# Patient Record
Sex: FEMALE | Race: WHITE | NOT HISPANIC OR LATINO | Employment: FULL TIME | URBAN - METROPOLITAN AREA
[De-identification: names, ages, dates, MRNs, and addresses within clinical notes are randomized per-mention and may not be internally consistent; named-entity substitution may affect disease eponyms.]

---

## 2017-03-24 ENCOUNTER — HOSPITAL ENCOUNTER (OUTPATIENT)
Dept: URGENT CARE | Facility: CLINIC | Age: 32
Setting detail: SPECIMEN
Discharge: HOME | End: 2017-03-24
Attending: FAMILY MEDICINE | Admitting: FAMILY MEDICINE

## 2017-03-24 LAB
C TRACH RRNA SPEC QL PROBE: NORMAL
N GONORRHOEA RRNA SPEC QL PROBE: NORMAL
SPECIMEN SOURCE: NORMAL

## 2017-03-25 LAB
HSV1 DNA SPEC QL NAA+PROBE: POSITIVE
SPECIMEN SOURCE: NORMAL

## 2017-04-06 ENCOUNTER — CONVERSION ENCOUNTER (OUTPATIENT)
Dept: FAMILY MEDICINE CLINIC | Facility: CLINIC | Age: 32
End: 2017-04-06

## 2017-04-06 LAB
ALBUMIN SERPL-MCNC: 4.4 G/DL (ref 3.6–5.1)
ALBUMIN/GLOB SERPL: ABNORMAL {RATIO} (ref 1–2.5)
ALP SERPL-CCNC: 41 UNITS/L (ref 33–115)
ALT SERPL-CCNC: 10 UNITS/L (ref 6–29)
AST SERPL-CCNC: 11 UNITS/L (ref 10–30)
BASOPHILS # BLD AUTO: ABNORMAL 10*3/MM3 (ref 0–200)
BASOPHILS NFR BLD AUTO: 0.3 %
BILIRUB SERPL-MCNC: 0.4 MG/DL (ref 0.2–1.2)
BUN SERPL-MCNC: 12 MG/DL (ref 7–25)
BUN/CREAT SERPL: ABNORMAL (ref 6–22)
CALCIUM SERPL-MCNC: 9.5 MG/DL (ref 8.6–10.2)
CHLORIDE SERPL-SCNC: 106 MMOL/L (ref 98–110)
CHOLEST SERPL-MCNC: 170 MG/DL (ref 125–200)
CHOLEST/HDLC SERPL: ABNORMAL {RATIO}
CO2 CONTENT VENOUS: 27 MMOL/L (ref 20–31)
CONV % HGB A1C: ABNORMAL %
CONV NEUTROPHILS/100 LEUKOCYTES IN BODY FLUID BY MANUAL COUNT: 55.7 %
CONV TOTAL PROTEIN: 6.8 G/DL (ref 6.1–8.1)
CREAT UR-MCNC: 0.7 MG/DL (ref 0.5–1.1)
EOSINOPHIL # BLD AUTO: 2.5 %
EOSINOPHIL # BLD AUTO: ABNORMAL 10*3/MM3 (ref 15–500)
ERYTHROCYTE [DISTWIDTH] IN BLOOD BY AUTOMATED COUNT: 13.6 % (ref 11–15)
GLOBULIN UR ELPH-MCNC: ABNORMAL G/DL (ref 1.9–3.7)
GLUCOSE SERPL-MCNC: 105 MG/DL (ref 65–99)
HCT VFR BLD AUTO: 42.2 % (ref 35–45)
HDLC SERPL-MCNC: 35 MG/DL
HGB BLD-MCNC: 13.9 G/DL (ref 11.7–15.5)
HSV1 IGG SER IA-ACNC: ABNORMAL
HSV2 IGG SER IA-ACNC: ABNORMAL
LDLC SERPL CALC-MCNC: ABNORMAL MG/DL
LYMPHOCYTES # BLD AUTO: ABNORMAL 10*3/MM3 (ref 850–3900)
LYMPHOCYTES NFR BLD AUTO: 37 %
MCH RBC QN AUTO: 29 PG (ref 27–33)
MCHC RBC AUTO-ENTMCNC: ABNORMAL % (ref 32–36)
MCV RBC AUTO: 88.1 FL (ref 80–100)
MONOCYTES # BLD AUTO: ABNORMAL 10*3/MICROLITER (ref 200–950)
MONOCYTES NFR BLD AUTO: 4.5 %
NEUTROPHILS # BLD AUTO: ABNORMAL 10*3/MM3 (ref 1500–7800)
PLATELET # BLD AUTO: ABNORMAL 10*3/MM3 (ref 140–400)
PMV BLD AUTO: 9.1 FL (ref 7.5–12.5)
POTASSIUM SERPL-SCNC: 4.5 MMOL/L (ref 3.5–5.3)
RBC # BLD AUTO: ABNORMAL 10*6/MM3 (ref 3.8–5.1)
SODIUM SERPL-SCNC: 139 MMOL/L (ref 135–146)
TRIGL SERPL-MCNC: 170 MG/DL
TSH SERPL-ACNC: 3.34 MICROINTL UNITS/ML
WBC # BLD AUTO: ABNORMAL K/UL (ref 3.8–10.8)

## 2017-05-09 ENCOUNTER — HOSPITAL ENCOUNTER (OUTPATIENT)
Dept: CARDIOLOGY | Facility: HOSPITAL | Age: 32
Discharge: HOME | End: 2017-05-09
Attending: FAMILY MEDICINE | Admitting: FAMILY MEDICINE

## 2017-10-23 ENCOUNTER — HOSPITAL ENCOUNTER (OUTPATIENT)
Dept: FAMILY MEDICINE CLINIC | Facility: CLINIC | Age: 32
Setting detail: SPECIMEN
Discharge: HOME | End: 2017-10-23
Attending: NURSE PRACTITIONER | Admitting: NURSE PRACTITIONER

## 2017-10-23 LAB
ALBUMIN SERPL-MCNC: 4 G/DL (ref 3.5–4.8)
ALBUMIN/GLOB SERPL: 1.7 {RATIO} (ref 1–1.7)
ALP SERPL-CCNC: 30 IU/L (ref 32–91)
ALT SERPL-CCNC: 12 IU/L (ref 14–54)
ANION GAP SERPL CALC-SCNC: 11.1 MMOL/L (ref 10–20)
AST SERPL-CCNC: 17 IU/L (ref 15–41)
BILIRUB SERPL-MCNC: 0.7 MG/DL (ref 0.3–1.2)
BUN SERPL-MCNC: 9 MG/DL (ref 8–20)
BUN/CREAT SERPL: 12.9 (ref 5.4–26.2)
CALCIUM SERPL-MCNC: 9.3 MG/DL (ref 8.9–10.3)
CHLORIDE SERPL-SCNC: 105 MMOL/L (ref 101–111)
CONV CO2: 25 MMOL/L (ref 22–32)
CONV TOTAL PROTEIN: 6.3 G/DL (ref 6.1–7.9)
CREAT UR-MCNC: 0.7 MG/DL (ref 0.4–1)
GLOBULIN UR ELPH-MCNC: 2.3 G/DL (ref 2.5–3.8)
GLUCOSE SERPL-MCNC: 97 MG/DL (ref 65–99)
POTASSIUM SERPL-SCNC: 4.1 MMOL/L (ref 3.6–5.1)
SODIUM SERPL-SCNC: 137 MMOL/L (ref 136–144)

## 2018-01-29 ENCOUNTER — HOSPITAL ENCOUNTER (OUTPATIENT)
Dept: FAMILY MEDICINE CLINIC | Facility: CLINIC | Age: 33
Setting detail: SPECIMEN
Discharge: HOME | End: 2018-01-29
Attending: NURSE PRACTITIONER | Admitting: NURSE PRACTITIONER

## 2018-01-29 LAB
ALBUMIN SERPL-MCNC: 3.9 G/DL (ref 3.5–4.8)
ALBUMIN/GLOB SERPL: 1.7 {RATIO} (ref 1–1.7)
ALP SERPL-CCNC: 36 IU/L (ref 32–91)
ALT SERPL-CCNC: 14 IU/L (ref 14–54)
ANION GAP SERPL CALC-SCNC: 10.6 MMOL/L (ref 10–20)
AST SERPL-CCNC: 14 IU/L (ref 15–41)
BILIRUB SERPL-MCNC: 1 MG/DL (ref 0.3–1.2)
BUN SERPL-MCNC: 12 MG/DL (ref 8–20)
BUN/CREAT SERPL: 15 (ref 5.4–26.2)
CALCIUM SERPL-MCNC: 8.9 MG/DL (ref 8.9–10.3)
CHLORIDE SERPL-SCNC: 105 MMOL/L (ref 101–111)
CONV CO2: 25 MMOL/L (ref 22–32)
CONV TOTAL PROTEIN: 6.2 G/DL (ref 6.1–7.9)
CREAT UR-MCNC: 0.8 MG/DL (ref 0.4–1)
GLOBULIN UR ELPH-MCNC: 2.3 G/DL (ref 2.5–3.8)
GLUCOSE SERPL-MCNC: 112 MG/DL (ref 65–99)
POTASSIUM SERPL-SCNC: 3.6 MMOL/L (ref 3.6–5.1)
SODIUM SERPL-SCNC: 137 MMOL/L (ref 136–144)

## 2019-03-07 ENCOUNTER — HOSPITAL ENCOUNTER (OUTPATIENT)
Dept: FAMILY MEDICINE CLINIC | Facility: CLINIC | Age: 34
Setting detail: SPECIMEN
Discharge: HOME | End: 2019-03-07
Attending: NURSE PRACTITIONER | Admitting: NURSE PRACTITIONER

## 2019-03-07 LAB
ALBUMIN SERPL-MCNC: 3.9 G/DL (ref 3.5–4.8)
ALBUMIN/GLOB SERPL: 1.4 {RATIO} (ref 1–1.7)
ALP SERPL-CCNC: 44 IU/L (ref 32–91)
ALT SERPL-CCNC: 17 IU/L (ref 14–54)
ANION GAP SERPL CALC-SCNC: 11.8 MMOL/L (ref 10–20)
AST SERPL-CCNC: 19 IU/L (ref 15–41)
BACTERIA SPEC AEROBE CULT: NORMAL
BASOPHILS # BLD AUTO: 0 10*3/UL (ref 0–0.2)
BASOPHILS NFR BLD AUTO: 1 % (ref 0–2)
BILIRUB SERPL-MCNC: 0.7 MG/DL (ref 0.3–1.2)
BILIRUB UR QL STRIP: NEGATIVE MG/DL
BUN SERPL-MCNC: 10 MG/DL (ref 8–20)
BUN/CREAT SERPL: 12.5 (ref 5.4–26.2)
CALCIUM SERPL-MCNC: 9 MG/DL (ref 8.9–10.3)
CASTS URNS QL MICRO: ABNORMAL /[LPF]
CHLORIDE SERPL-SCNC: 104 MMOL/L (ref 101–111)
CHOLEST SERPL-MCNC: 184 MG/DL
CHOLEST/HDLC SERPL: 4.6 {RATIO}
COLOR UR: YELLOW
CONV BACTERIA IN URINE MICRO: ABNORMAL
CONV CLARITY OF URINE: ABNORMAL
CONV CO2: 24 MMOL/L (ref 22–32)
CONV HYALINE CASTS IN URINE MICRO: 0 /[LPF] (ref 0–5)
CONV LDL CHOLESTEROL DIRECT: 135 MG/DL (ref 0–100)
CONV PROTEIN IN URINE BY AUTOMATED TEST STRIP: NEGATIVE MG/DL
CONV SMALL ROUND CELLS: ABNORMAL /[HPF]
CONV TOTAL PROTEIN: 6.7 G/DL (ref 6.1–7.9)
CONV UROBILINOGEN IN URINE BY AUTOMATED TEST STRIP: 0.2 MG/DL
CREAT UR-MCNC: 0.8 MG/DL (ref 0.4–1)
CULTURE INDICATED?: ABNORMAL
DIFFERENTIAL METHOD BLD: (no result)
EOSINOPHIL # BLD AUTO: 0.1 10*3/UL (ref 0–0.3)
EOSINOPHIL # BLD AUTO: 1 % (ref 0–3)
ERYTHROCYTE [DISTWIDTH] IN BLOOD BY AUTOMATED COUNT: 13.8 % (ref 11.5–14.5)
GLOBULIN UR ELPH-MCNC: 2.8 G/DL (ref 2.5–3.8)
GLUCOSE SERPL-MCNC: 126 MG/DL (ref 65–99)
GLUCOSE UR QL: NEGATIVE MG/DL
HCT VFR BLD AUTO: 43.4 % (ref 35–49)
HDLC SERPL-MCNC: 40 MG/DL
HGB BLD-MCNC: 14.3 G/DL (ref 12–15)
HGB UR QL STRIP: ABNORMAL
KETONES UR QL STRIP: NEGATIVE MG/DL
LDLC/HDLC SERPL: 3.3 {RATIO}
LEUKOCYTE ESTERASE UR QL STRIP: ABNORMAL
LIPID INTERPRETATION: ABNORMAL
LYMPHOCYTES # BLD AUTO: 2.9 10*3/UL (ref 0.8–4.8)
LYMPHOCYTES NFR BLD AUTO: 42 % (ref 18–42)
Lab: NORMAL
MCH RBC QN AUTO: 29.2 PG (ref 26–32)
MCHC RBC AUTO-ENTMCNC: 33.1 G/DL (ref 32–36)
MCV RBC AUTO: 88.3 FL (ref 80–94)
MICRO REPORT STATUS: NORMAL
MONOCYTES # BLD AUTO: 0.5 10*3/UL (ref 0.1–1.3)
MONOCYTES NFR BLD AUTO: 7 % (ref 2–11)
NEUTROPHILS # BLD AUTO: 3.3 10*3/UL (ref 2.3–8.6)
NEUTROPHILS NFR BLD AUTO: 49 % (ref 50–75)
NITRITE UR QL STRIP: NEGATIVE
NRBC BLD AUTO-RTO: 0 /100{WBCS}
NRBC/RBC NFR BLD MANUAL: 0 10*3/UL
PH UR STRIP.AUTO: 5.5 [PH] (ref 4.5–8)
PLATELET # BLD AUTO: 263 10*3/UL (ref 150–450)
PMV BLD AUTO: 8.3 FL (ref 7.4–10.4)
POTASSIUM SERPL-SCNC: 3.8 MMOL/L (ref 3.6–5.1)
RBC # BLD AUTO: 4.91 10*6/UL (ref 4–5.4)
RBC #/AREA URNS HPF: 4 /[HPF] (ref 0–3)
SODIUM SERPL-SCNC: 136 MMOL/L (ref 136–144)
SP GR UR: 1.03 (ref 1–1.03)
SPECIMEN SOURCE: NORMAL
SPERM URNS QL MICRO: ABNORMAL /[HPF]
SQUAMOUS SPT QL MICRO: 6 /[HPF] (ref 0–5)
TRIGL SERPL-MCNC: 111 MG/DL
UNIDENT CRYS URNS QL MICRO: ABNORMAL /[HPF]
VLDLC SERPL CALC-MCNC: 8.3 MG/DL
WBC # BLD AUTO: 6.9 10*3/UL (ref 4.5–11.5)
WBC #/AREA URNS HPF: 23 /[HPF] (ref 0–5)
YEAST SPEC QL WET PREP: ABNORMAL /[HPF]

## 2019-03-11 ENCOUNTER — HOSPITAL ENCOUNTER (OUTPATIENT)
Dept: ULTRASOUND IMAGING | Facility: HOSPITAL | Age: 34
Discharge: HOME | End: 2019-03-11
Attending: NURSE PRACTITIONER | Admitting: NURSE PRACTITIONER

## 2019-06-03 ENCOUNTER — CONVERSION ENCOUNTER (OUTPATIENT)
Dept: FAMILY MEDICINE CLINIC | Facility: CLINIC | Age: 34
End: 2019-06-03

## 2019-06-04 VITALS
SYSTOLIC BLOOD PRESSURE: 120 MMHG | BODY MASS INDEX: 47.05 KG/M2 | RESPIRATION RATE: 8 BRPM | HEART RATE: 64 BPM | WEIGHT: 293 LBS | DIASTOLIC BLOOD PRESSURE: 82 MMHG

## 2019-06-06 NOTE — PROGRESS NOTES
"Vital Signs:    Patient Profile:    34 Years Old Female  Height:     68.5 inches  Weight:     314 pounds  BMI:        47.04     Temp:       98.1 degrees F oral  Pulse rate: 64 / minute  Pulse rhythm:   regular  Resp:       8 per minute  BP Sittin / 82  (left arm)    Cuff size:  large      Problems: Active problems were reviewed with the patient during this visit.  Medications: Medications were reviewed with the patient during this visit.  Allergies: Allergies were reviewed with the patient during this visit.  No Known Allergy.        Vitals Entered By: [facility]      Visit Type:  Acute Visit  Referring Provider:  SALO Matthews  Primary Provider:  Teresita LEONG    CC:  back pain.    History of Present Illness:  34-year-old white female presents to office today with c/o back pain x1week. Denies injury.    Current: 8/10.  Worse: 10/10. Best: 0/10.   Pain starts in her lumbar area and radiates to neck. Also reports middle left finger twitching.   Describes pain as a buring sensation. Sometimes it is sharp.    Tried stretching and Alieve- \"took the edge offf of pain but did not take it away.\"   Moving makes her pain worse.  Pain improves when lying down.   Pain has not prevented patient from working, attending social functions, performing ADLs, or from performing house chores.     [employer]- closed 2 weeks ago and she is not working.  She has been hired at [employer].       Past Medical History:     Reviewed history from 2017 and no changes required:        Headache, Migraine        Palpitation        Hospitalizations: childbirth        Health Maintenance: denies abnormal pap        genital herpes        prediabetes         Social History:     Reviewed history from 2019 and no changes required:        Marital status: relationship         Children: 3 (ages 9,8,6)        Occupation:         Education: [employer] for radiology         Sexually active: yes, same partner since ~10/2016        Risk Factors: "     Smoked Tobacco Use:  Former smoker     Cigarettes:  Yes        Years smoked:  3        Year quit:  2005        Years Since Last Quit:  14  Smokeless Tobacco Use:  Never  Passive smoke exposure:  yes  Drug use:  no  Caffeine use:  3 drinks per day  Alcohol use:  yes     Drinks per day:  <1  Exercise:  no  Seatbelt use:  100 %  Sun Exposure:  rarely    Family History Risk Factors:     Family History of MI in females < 65 years old:  no     Family History of MI in males < 55 years old:  no      Review of Systems     Eyes       Denies double vision, eye irritation, blurring, eye pain and discharge.    ENT       Denies ringing in the ears.    GI       Denies nausea, vomiting, diarrhea and constipation.    MS       Complains of back pain.    Neuro       Denies numbness and tingling.      Physical Exam   Weight:  303.6  BP:  120/82 mm HG    Medication List:  OZEMPIC 0.25 OR 0.5 MG/DOSE SUBCUTANEOUS SOLUTION PEN-INJECTOR (SEMAGLUTIDE) 0.5mg once weekly with needles  CYCLOBENZAPRINE HCL 10 MG ORAL TABLET (CYCLOBENZAPRINE HCL) 1 po tid prn  MEDROL 4 MG ORAL TABLET THERAPY PACK (METHYLPREDNISOLONE) take 1 pack as directed  SYNTHROID 50 MCG ORAL TABLET (LEVOTHYROXINE SODIUM) 1 po daily  GIOVANNA CONTOUR MONITOR w/Device KIT (BLOOD GLUCOSE MONITORING SUPPL) machine strips and lancets  test once daily  DX:E11.9  VALTREX 500 MG ORAL TABLET (VALACYCLOVIR HCL) one tablet by mouth daily      Surgical History   Tubal ligation 3/2009    ,    Risk Factors  Tobacco Use: Former smoker  Passive smoke exposure: yes  Exercise: no  Illicit Drug use: no      Physical Examination   General Appearance   In no acute distress.  Alert & oriented.  Behavior and affect appropriate to situation  Skin   No suspicious lesions, moles or rashes . Turgor good  Neck   Supple.  No adenopathy.   Full active ROM with minimal discomfort  Cardiovascular   Regular rate and rhythm  Lungs   Clear to auscultation  Back   decreased overall flexibility, lateral  extension decreased, tender along spine, straight leg lift negative   Musculoskeletal   Gait and station normal, with adequate muscle strength and tone.  Normal ROM to neck, back and extremities.           Impression & Recommendations:    Problem # 1:  LUMBAGO (ICD-724.2) (PYF14-O09.5)    Her updated medication list for this problem includes:     Cyclobenzaprine Hcl 10 Mg Oral Tablet (Cyclobenzaprine hcl) ..... 1 po tid prn    Orders:  Depo-Medrol 80 mg ()  Injection, therapeutic (CPT-49999)      Problem # 2:  PREDIABETES (ICD-790.29) (MFJ12-K00.09)  pt never followed up on ozempic. She had started it, but then missed her followup appt. Was tolerating it ok. We had stopped the metformin due to neg. side effects, and we started ozempic for both prediabetes and to see if it would help with weight loss.   Will give rx.     The following medications were removed from the medication list:     Metformin Er 500mg 24hr Tabs (Metformin hcl) ..... Take 1 tablet by mouth daily    Her updated medication list for this problem includes:     Ozempic 0.25 or 0.5 Mg/dose Subcutaneous Solution Pen-injector (Semaglutide) ..... 0.5mg once weekly with needles      Medications Added to Medication List This Visit:  1)  Ozempic 0.25 or 0.5 Mg/dose Subcutaneous Solution Pen-injector (Semaglutide) .... 0.5mg once weekly with needles  2)  Cyclobenzaprine Hcl 10 Mg Oral Tablet (Cyclobenzaprine hcl) .... 1 po tid prn  3)  Medrol 4 Mg Oral Tablet Therapy Pack (Methylprednisolone) .... Take 1 pack as directed      Patient Instructions:  1)  80mg depo medrol inj  2)  medrol dose pack  3)  flexeril prn  4)  ice/moist heat  5)  stretches. handout given.   6)  rx for ozempic  7)  follow up as needed   8)  During this office visit we discussed the pertinent aspects of the visit and the treatment recommendations.  Patient was given the opportunity to ask questions and discuss other concerns.                Medication Administration    Orders  Added:  1)  Depo-Medrol 80 mg []  2)  Injection, therapeutic [CPT-31149]  3)  Ofc Vst, Est Level IV [46068]        Electronically signed by Teresita LEONG on 06/03/2019 at 4:38 PM  ________________________________________________________________________                  Medication Administration    Injection # 1:     Medication: depo medrol 80mg     Route: IM     Site: [facility]     Exp Date: 06/20     Lot #: [id number]     Mfr: teva     Given by: Michell ECHEVARRIA     Date Given:06/03/2019 4:41 PM     Tolerated w/o complications                Electronically signed by Michell ECHEVARRIA on 06/03/2019 at 4:41 PM  ________________________________________________________________________       Disclaimer: Converted Note message may not contain all data elements that existed in the [employer] source system. Please see [facility] [employer] for the original note details.

## 2019-08-17 ENCOUNTER — APPOINTMENT (OUTPATIENT)
Dept: CT IMAGING | Facility: HOSPITAL | Age: 34
End: 2019-08-17

## 2019-08-17 ENCOUNTER — HOSPITAL ENCOUNTER (EMERGENCY)
Facility: HOSPITAL | Age: 34
Discharge: HOME | End: 2019-08-17
Admitting: EMERGENCY MEDICINE

## 2019-08-17 VITALS
WEIGHT: 293 LBS | TEMPERATURE: 98.3 F | HEIGHT: 68 IN | DIASTOLIC BLOOD PRESSURE: 77 MMHG | OXYGEN SATURATION: 99 % | SYSTOLIC BLOOD PRESSURE: 131 MMHG | RESPIRATION RATE: 18 BRPM | BODY MASS INDEX: 44.41 KG/M2 | HEART RATE: 58 BPM

## 2019-08-17 DIAGNOSIS — R42 DIZZINESS: Primary | ICD-10-CM

## 2019-08-17 DIAGNOSIS — R42 VERTIGO: ICD-10-CM

## 2019-08-17 LAB
ALBUMIN SERPL-MCNC: 4.1 G/DL (ref 3.5–4.8)
ALBUMIN/GLOB SERPL: 1.5 G/DL (ref 1–1.7)
ALP SERPL-CCNC: 43 U/L (ref 32–91)
ALT SERPL W P-5'-P-CCNC: 16 U/L (ref 14–54)
AMPHET+METHAMPHET UR QL: NEGATIVE
ANION GAP SERPL CALCULATED.3IONS-SCNC: 14.6 MMOL/L (ref 5–15)
AST SERPL-CCNC: 17 U/L (ref 15–41)
B-HCG UR QL: NEGATIVE
BACTERIA UR QL AUTO: ABNORMAL /HPF
BARBITURATES UR QL SCN: NEGATIVE
BASOPHILS # BLD AUTO: 0.1 10*3/MM3 (ref 0–0.2)
BASOPHILS NFR BLD AUTO: 1.5 % (ref 0–1.5)
BENZODIAZ UR QL SCN: NEGATIVE
BILIRUB SERPL-MCNC: 0.8 MG/DL (ref 0.3–1.2)
BILIRUB UR QL STRIP: NEGATIVE
BUN BLD-MCNC: 14 MG/DL (ref 8–20)
BUN/CREAT SERPL: 17.5 (ref 5.4–26.2)
CALCIUM SPEC-SCNC: 8.9 MG/DL (ref 8.9–10.3)
CANNABINOIDS SERPL QL: NEGATIVE
CHLORIDE SERPL-SCNC: 101 MMOL/L (ref 101–111)
CLARITY UR: CLEAR
CO2 SERPL-SCNC: 23 MMOL/L (ref 22–32)
COCAINE UR QL: NEGATIVE
COLOR UR: YELLOW
CREAT BLD-MCNC: 0.8 MG/DL (ref 0.4–1)
CREAT UR-MCNC: 16.4 MG/DL
DEPRECATED RDW RBC AUTO: 42.9 FL (ref 37–54)
EOSINOPHIL # BLD AUTO: 0.1 10*3/MM3 (ref 0–0.4)
EOSINOPHIL NFR BLD AUTO: 1.1 % (ref 0.3–6.2)
ERYTHROCYTE [DISTWIDTH] IN BLOOD BY AUTOMATED COUNT: 13.9 % (ref 12.3–15.4)
GFR SERPL CREATININE-BSD FRML MDRD: 82 ML/MIN/1.73
GLOBULIN UR ELPH-MCNC: 2.7 GM/DL (ref 2.5–3.8)
GLUCOSE BLD-MCNC: 98 MG/DL (ref 65–99)
GLUCOSE BLDC GLUCOMTR-MCNC: 92 MG/DL (ref 70–105)
GLUCOSE UR STRIP-MCNC: NEGATIVE MG/DL
HCT VFR BLD AUTO: 40.5 % (ref 34–46.6)
HGB BLD-MCNC: 13.6 G/DL (ref 12–15.9)
HGB UR QL STRIP.AUTO: NEGATIVE
HOLD SPECIMEN: NORMAL
HOLD SPECIMEN: NORMAL
HYALINE CASTS UR QL AUTO: ABNORMAL /LPF
KETONES UR QL STRIP: NEGATIVE
LEUKOCYTE ESTERASE UR QL STRIP.AUTO: ABNORMAL
LYMPHOCYTES # BLD AUTO: 3.1 10*3/MM3 (ref 0.7–3.1)
LYMPHOCYTES NFR BLD AUTO: 37.2 % (ref 19.6–45.3)
MCH RBC QN AUTO: 29.5 PG (ref 26.6–33)
MCHC RBC AUTO-ENTMCNC: 33.5 G/DL (ref 31.5–35.7)
MCV RBC AUTO: 88 FL (ref 79–97)
METHADONE UR QL SCN: NEGATIVE
MONOCYTES # BLD AUTO: 0.6 10*3/MM3 (ref 0.1–0.9)
MONOCYTES NFR BLD AUTO: 7 % (ref 5–12)
NEUTROPHILS # BLD AUTO: 4.4 10*3/MM3 (ref 1.7–7)
NEUTROPHILS NFR BLD AUTO: 53.2 % (ref 42.7–76)
NITRITE UR QL STRIP: NEGATIVE
NRBC BLD AUTO-RTO: 0.1 /100 WBC (ref 0–0.2)
OPIATES UR QL: NEGATIVE
PCP UR QL SCN: NEGATIVE
PH UR STRIP.AUTO: 6 [PH] (ref 5–8)
PLATELET # BLD AUTO: 281 10*3/MM3 (ref 140–450)
PMV BLD AUTO: 8.4 FL (ref 6–12)
POTASSIUM BLD-SCNC: 3.6 MMOL/L (ref 3.6–5.1)
PROT SERPL-MCNC: 6.8 G/DL (ref 6.1–7.9)
PROT UR QL STRIP: NEGATIVE
RBC # BLD AUTO: 4.6 10*6/MM3 (ref 3.77–5.28)
RBC # UR: ABNORMAL /HPF
REF LAB TEST METHOD: ABNORMAL
SODIUM BLD-SCNC: 135 MMOL/L (ref 136–144)
SP GR UR STRIP: 1.01 (ref 1–1.03)
SQUAMOUS #/AREA URNS HPF: ABNORMAL /HPF
TROPONIN I SERPL-MCNC: <0.03 NG/ML (ref 0–0.03)
UROBILINOGEN UR QL STRIP: ABNORMAL
WBC NRBC COR # BLD: 8.3 10*3/MM3 (ref 3.4–10.8)
WBC UR QL AUTO: ABNORMAL /HPF
WHOLE BLOOD HOLD SPECIMEN: NORMAL
WHOLE BLOOD HOLD SPECIMEN: NORMAL

## 2019-08-17 PROCEDURE — 82570 ASSAY OF URINE CREATININE: CPT | Performed by: PHYSICIAN ASSISTANT

## 2019-08-17 PROCEDURE — 80307 DRUG TEST PRSMV CHEM ANLYZR: CPT | Performed by: PHYSICIAN ASSISTANT

## 2019-08-17 PROCEDURE — 99284 EMERGENCY DEPT VISIT MOD MDM: CPT

## 2019-08-17 PROCEDURE — 93005 ELECTROCARDIOGRAM TRACING: CPT | Performed by: PHYSICIAN ASSISTANT

## 2019-08-17 PROCEDURE — 81025 URINE PREGNANCY TEST: CPT | Performed by: PHYSICIAN ASSISTANT

## 2019-08-17 PROCEDURE — 81001 URINALYSIS AUTO W/SCOPE: CPT | Performed by: PHYSICIAN ASSISTANT

## 2019-08-17 PROCEDURE — 82962 GLUCOSE BLOOD TEST: CPT

## 2019-08-17 PROCEDURE — 80053 COMPREHEN METABOLIC PANEL: CPT | Performed by: PHYSICIAN ASSISTANT

## 2019-08-17 PROCEDURE — 84484 ASSAY OF TROPONIN QUANT: CPT | Performed by: PHYSICIAN ASSISTANT

## 2019-08-17 PROCEDURE — 85025 COMPLETE CBC W/AUTO DIFF WBC: CPT | Performed by: PHYSICIAN ASSISTANT

## 2019-08-17 PROCEDURE — 70450 CT HEAD/BRAIN W/O DYE: CPT

## 2019-08-17 PROCEDURE — 94770: CPT

## 2019-08-17 RX ORDER — SODIUM CHLORIDE 0.9 % (FLUSH) 0.9 %
10 SYRINGE (ML) INJECTION AS NEEDED
Status: DISCONTINUED | OUTPATIENT
Start: 2019-08-17 | End: 2019-08-17 | Stop reason: HOSPADM

## 2019-08-17 RX ADMIN — SODIUM CHLORIDE 1000 ML: 0.9 INJECTION, SOLUTION INTRAVENOUS at 13:43

## 2019-08-17 NOTE — ED PROVIDER NOTES
"Subjective   History:  She is a 34-year-old female presents to the ER complaining of dizziness.  She reports it feels like the world is spinning.  She reports it started this morning.  She reports that she has \"borderline diabetes\".  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  Earlier this week she did have on Monday some abdominal pain and diarrhea but that is since resolved.    Onset: 1 day  Location: generalized  Duration: intermittent  Character: dizziness  Aggravating/Alleviating factors: None  Radiation none  Severity: moderate            Review of Systems   Constitutional: Negative.    HENT: Negative.    Respiratory: Negative.    Cardiovascular: Negative.    Gastrointestinal: Negative.    Genitourinary: Negative.    Musculoskeletal: Negative.    Skin: Negative.    Neurological: Positive for dizziness and light-headedness.   Psychiatric/Behavioral: Negative.        No past medical history on file.    No Known Allergies    No past surgical history on file.    No family history on file.    Social History     Socioeconomic History   • Marital status: Single     Spouse name: Not on file   • Number of children: Not on file   • Years of education: Not on file   • Highest education level: Not on file           Objective   Physical Exam   Constitutional: She is oriented to person, place, and time. She appears well-developed and well-nourished.   HENT:   Head: Normocephalic and atraumatic.   Eyes: Pupils are equal, round, and reactive to light.   Neck: Normal range of motion.   Cardiovascular: Normal rate and regular rhythm.   Pulmonary/Chest: Effort normal and breath sounds normal.   Musculoskeletal: Normal range of motion.   Neurological: She is alert and oriented to person, place, and time.   Skin: Skin is warm and dry.   Psychiatric: She has a normal mood and affect. Her behavior is normal.       Procedures           ED Course    "   Ct Head Without Contrast    Result Date: 8/17/2019  No acute intracranial abnormality.  Electronically Signed By-Zachary Manning On:8/17/2019 3:11 PM This report was finalized on [id number] by  Zachary Manning, .    Labs Reviewed   URINALYSIS W/ MICROSCOPIC IF INDICATED (NO CULTURE) - Abnormal; Notable for the following components:       Result Value    Leuk Esterase, UA Trace (*)     All other components within normal limits   COMPREHENSIVE METABOLIC PANEL - Abnormal; Notable for the following components:    Sodium 135 (*)     All other components within normal limits   URINALYSIS, MICROSCOPIC ONLY - Abnormal; Notable for the following components:    RBC, UA 0-2 (*)     WBC, UA 3-5 (*)     All other components within normal limits   PREGNANCY, URINE - Normal   TROPONIN (IN-HOUSE) - Normal    Narrative:     Troponin I Reference Range:    0.00-0.03  Negative.  Repeat testing in 4-6 hours if clinically indicated.    0.04-0.29  Suspicious for myocardial injury. Serial measurements and clinical  correlation may be necessary to confirm or exclude diagnosis of acute  coronary syndrome.  Repeat testing in 4-6 hours if indicated.     >0.29 Consistent with myocardial injury.  Recommend clinical and laboratory correlation.     Results my be falsely decreased if patient taking Biotin.    CBC WITH AUTO DIFFERENTIAL - Normal   POCT GLUCOSE FINGERSTICK - Normal   RAINBOW DRAW    Narrative:     The following orders were created for panel order [place] Draw.  Procedure                               Abnormality         Status                     ---------                               -----------         ------                     Light Blue Top[[id number]]                                   Final result               Green Top (Gel)[[id number]]                                  Final result               Lavender Top[[id number]]                                     Final result               Gold Top - [facility][[id number]]                                    Final result                 Please view results for these tests on the individual orders.   URINE DRUG SCREEN    Narrative:     Specimens with a urine creatinine <20mg/dL may express a dilution effect which can cause false negatives for the drug results.    Specimens with a urine creatinine <20mg/dL may express a dilution effect which can cause false negatives for the drug results.    All urine drugs of abuse requests without chain of custody are for medical screening purposes only.  False positives are possible.     LIGHT BLUE TOP   GREEN TOP   LAVENDER TOP   GOLD TOP - SST   CBC AND DIFFERENTIAL    Narrative:     The following orders were created for panel order CBC & Differential.  Procedure                               Abnormality         Status                     ---------                               -----------         ------                     CBC Auto Differential[[id number]]        Normal              Final result                 Please view results for these tests on the individual orders.     Medications   sodium chloride 0.9 % flush 10 mL (not administered)   sodium chloride 0.9 % bolus 1,000 mL (0 mL Intravenous Stopped 8/17/19 1420)                 MDM  Number of Diagnoses or Management Options  Dizziness:   Vertigo:   Diagnosis management comments: DISPOSITION:   Chart Review:  Comorbidity:  has no past medical history on file.  Differentials:this list is not all inclusive and does not constitute the entirety of considered causes --> dizziness, vertigo, UTI, electrolyte abnormality   ECG: interpreted by ER physician and reviewed by myself: Sinus rhythm  Labs: CBC - WBC/Hgb/Hct/Plts: --/13.6/40.5/-- (08/17 1339) LYTES - Na/K/Cl/CO2: 135*/3.6/101/23.0 (08/17 1339) CHEM - BUN/Cr/glu/ALT/AST/amyl/lip:14/--/--/16/17/--/-- (08/17 1339)   .rr    Imaging: Was interpreted by physician and reviewed by myself:  Ct Head Without Contrast    Result Date: 8/17/2019  No acute intracranial  abnormality.  Electronically Signed By-Zachary Manning On:8/17/2019 3:11 PM This report was finalized on [id number] by  Zachary Manning, .      Disposition/Treatment:  Patient is a 34-year-old female who presents to the ER with some dizziness and vertigo symptoms earlier.  She felt like the world was spinning.  Her lab work and imaging was all unremarkable.  She was discharged home in stable condition return precautions all concerns were provided she was in agreement with plan.       Amount and/or Complexity of Data Reviewed  Clinical lab tests: reviewed  Tests in the medicine section of CPT®: reviewed          Final diagnoses:   Dizziness   Vertigo            Cynthia Bundy PA-C  08/17/19 1514

## 2019-08-17 NOTE — DISCHARGE INSTRUCTIONS
Return to the ER for any worsening symptoms.  20 of fluids and follow-up with your primary care doctor this next week.

## 2019-12-09 ENCOUNTER — TELEPHONE (OUTPATIENT)
Dept: FAMILY MEDICINE CLINIC | Facility: CLINIC | Age: 34
End: 2019-12-09

## 2019-12-09 RX ORDER — VALACYCLOVIR HYDROCHLORIDE 500 MG/1
500 TABLET, FILM COATED ORAL 2 TIMES DAILY
Qty: 30 TABLET | Refills: 0 | Status: SHIPPED | OUTPATIENT
Start: 2019-12-09 | End: 2021-08-28

## 2019-12-09 RX ORDER — VALACYCLOVIR HYDROCHLORIDE 500 MG/1
TABLET, FILM COATED ORAL
COMMUNITY
Start: 2017-04-06 | End: 2019-12-09 | Stop reason: SDUPTHER

## 2019-12-09 NOTE — TELEPHONE ENCOUNTER
Patient called stating that she had another breakout and is out of valacyclovir 500 mg. Patient is asking if new script can be sent in.

## 2020-04-30 ENCOUNTER — TELEPHONE (OUTPATIENT)
Dept: FAMILY MEDICINE CLINIC | Facility: CLINIC | Age: 35
End: 2020-04-30

## 2020-04-30 NOTE — TELEPHONE ENCOUNTER
Patient had fax sent in stating she needs appointment ASAP. She gave no other information and wants a call at [phone]

## 2020-05-06 ENCOUNTER — OFFICE VISIT (OUTPATIENT)
Dept: FAMILY MEDICINE CLINIC | Facility: CLINIC | Age: 35
End: 2020-05-06

## 2020-05-06 ENCOUNTER — LAB (OUTPATIENT)
Dept: FAMILY MEDICINE CLINIC | Facility: CLINIC | Age: 35
End: 2020-05-06

## 2020-05-06 VITALS
SYSTOLIC BLOOD PRESSURE: 120 MMHG | OXYGEN SATURATION: 98 % | DIASTOLIC BLOOD PRESSURE: 80 MMHG | RESPIRATION RATE: 8 BRPM | BODY MASS INDEX: 44.41 KG/M2 | WEIGHT: 293 LBS | HEART RATE: 60 BPM | TEMPERATURE: 97 F | HEIGHT: 68 IN

## 2020-05-06 DIAGNOSIS — E03.8 OTHER SPECIFIED HYPOTHYROIDISM: ICD-10-CM

## 2020-05-06 DIAGNOSIS — L65.9 PATCHY LOSS OF HAIR: ICD-10-CM

## 2020-05-06 DIAGNOSIS — R73.03 PREDIABETES: ICD-10-CM

## 2020-05-06 DIAGNOSIS — L65.9 PATCHY LOSS OF HAIR: Primary | ICD-10-CM

## 2020-05-06 PROBLEM — A60.00 GENITAL HERPES SIMPLEX: Status: ACTIVE | Noted: 2017-03-24

## 2020-05-06 PROBLEM — E04.9 ENLARGED THYROID: Status: ACTIVE | Noted: 2019-03-07

## 2020-05-06 PROBLEM — R73.02 IMPAIRED GLUCOSE TOLERANCE: Status: ACTIVE | Noted: 2017-04-17

## 2020-05-06 LAB
25(OH)D3 SERPL-MCNC: 13.2 NG/ML (ref 30–100)
ALBUMIN SERPL-MCNC: 4.1 G/DL (ref 3.5–5.2)
ALBUMIN/GLOB SERPL: 1.6 G/DL
ALP SERPL-CCNC: 42 U/L (ref 39–117)
ALT SERPL W P-5'-P-CCNC: 14 U/L (ref 1–33)
ANION GAP SERPL CALCULATED.3IONS-SCNC: 12.9 MMOL/L (ref 5–15)
AST SERPL-CCNC: 15 U/L (ref 1–32)
BASOPHILS # BLD AUTO: 0.04 10*3/MM3 (ref 0–0.2)
BASOPHILS NFR BLD AUTO: 0.7 % (ref 0–1.5)
BILIRUB SERPL-MCNC: 0.8 MG/DL (ref 0.2–1.2)
BUN BLD-MCNC: 10 MG/DL (ref 6–20)
BUN/CREAT SERPL: 12.8 (ref 7–25)
CALCIUM SPEC-SCNC: 9 MG/DL (ref 8.6–10.5)
CHLORIDE SERPL-SCNC: 103 MMOL/L (ref 98–107)
CO2 SERPL-SCNC: 23.1 MMOL/L (ref 22–29)
CREAT BLD-MCNC: 0.78 MG/DL (ref 0.57–1)
DEPRECATED RDW RBC AUTO: 41.4 FL (ref 37–54)
EOSINOPHIL # BLD AUTO: 0.1 10*3/MM3 (ref 0–0.4)
EOSINOPHIL NFR BLD AUTO: 1.7 % (ref 0.3–6.2)
ERYTHROCYTE [DISTWIDTH] IN BLOOD BY AUTOMATED COUNT: 13.4 % (ref 12.3–15.4)
FERRITIN SERPL-MCNC: 67.7 NG/ML (ref 13–150)
GFR SERPL CREATININE-BSD FRML MDRD: 84 ML/MIN/1.73
GLOBULIN UR ELPH-MCNC: 2.6 GM/DL
GLUCOSE BLD-MCNC: 117 MG/DL (ref 65–99)
HBA1C MFR BLD: 5.9 % (ref 3.5–5.6)
HCT VFR BLD AUTO: 40.2 % (ref 34–46.6)
HGB BLD-MCNC: 13.8 G/DL (ref 12–15.9)
IMM GRANULOCYTES # BLD AUTO: 0.01 10*3/MM3 (ref 0–0.05)
IMM GRANULOCYTES NFR BLD AUTO: 0.2 % (ref 0–0.5)
LYMPHOCYTES # BLD AUTO: 2.15 10*3/MM3 (ref 0.7–3.1)
LYMPHOCYTES NFR BLD AUTO: 37.4 % (ref 19.6–45.3)
MCH RBC QN AUTO: 29.3 PG (ref 26.6–33)
MCHC RBC AUTO-ENTMCNC: 34.3 G/DL (ref 31.5–35.7)
MCV RBC AUTO: 85.4 FL (ref 79–97)
MONOCYTES # BLD AUTO: 0.44 10*3/MM3 (ref 0.1–0.9)
MONOCYTES NFR BLD AUTO: 7.7 % (ref 5–12)
NEUTROPHILS # BLD AUTO: 3.01 10*3/MM3 (ref 1.7–7)
NEUTROPHILS NFR BLD AUTO: 52.3 % (ref 42.7–76)
NRBC BLD AUTO-RTO: 0 /100 WBC (ref 0–0.2)
PLATELET # BLD AUTO: 255 10*3/MM3 (ref 140–450)
PMV BLD AUTO: 10.6 FL (ref 6–12)
POTASSIUM BLD-SCNC: 4.2 MMOL/L (ref 3.5–5.2)
PROT SERPL-MCNC: 6.7 G/DL (ref 6–8.5)
RBC # BLD AUTO: 4.71 10*6/MM3 (ref 3.77–5.28)
SODIUM BLD-SCNC: 139 MMOL/L (ref 136–145)
T3 SERPL-MCNC: 123 NG/DL (ref 80–200)
T4 FREE SERPL-MCNC: 0.78 NG/DL (ref 0.93–1.7)
TSH SERPL DL<=0.05 MIU/L-ACNC: 11.7 UIU/ML (ref 0.27–4.2)
VIT B12 BLD-MCNC: 377 PG/ML (ref 211–946)
WBC NRBC COR # BLD: 5.75 10*3/MM3 (ref 3.4–10.8)

## 2020-05-06 PROCEDURE — 80053 COMPREHEN METABOLIC PANEL: CPT | Performed by: NURSE PRACTITIONER

## 2020-05-06 PROCEDURE — 82728 ASSAY OF FERRITIN: CPT | Performed by: NURSE PRACTITIONER

## 2020-05-06 PROCEDURE — 84439 ASSAY OF FREE THYROXINE: CPT | Performed by: NURSE PRACTITIONER

## 2020-05-06 PROCEDURE — 99214 OFFICE O/P EST MOD 30 MIN: CPT | Performed by: NURSE PRACTITIONER

## 2020-05-06 PROCEDURE — 86038 ANTINUCLEAR ANTIBODIES: CPT | Performed by: NURSE PRACTITIONER

## 2020-05-06 PROCEDURE — 83036 HEMOGLOBIN GLYCOSYLATED A1C: CPT | Performed by: NURSE PRACTITIONER

## 2020-05-06 PROCEDURE — 84480 ASSAY TRIIODOTHYRONINE (T3): CPT | Performed by: NURSE PRACTITIONER

## 2020-05-06 PROCEDURE — 85025 COMPLETE CBC W/AUTO DIFF WBC: CPT | Performed by: NURSE PRACTITIONER

## 2020-05-06 PROCEDURE — 82306 VITAMIN D 25 HYDROXY: CPT | Performed by: NURSE PRACTITIONER

## 2020-05-06 PROCEDURE — 82607 VITAMIN B-12: CPT | Performed by: NURSE PRACTITIONER

## 2020-05-06 PROCEDURE — 84443 ASSAY THYROID STIM HORMONE: CPT | Performed by: NURSE PRACTITIONER

## 2020-05-06 NOTE — PROGRESS NOTES
"Subjective   Ashly Garay is a 35 y.o. female.     Chief Complaint   Patient presents with   • bald patches       /80   Pulse 60   Temp 97 °F (36.1 °C) (Temporal)   Resp 8   Ht 172.7 cm (68\")   Wt (!) 138 kg (304 lb)   SpO2 98%   BMI 46.22 kg/m²     BP Readings from Last 3 Encounters:   05/06/20 120/80   08/17/19 131/77   06/03/19 120/82       Wt Readings from Last 3 Encounters:   05/06/20 (!) 138 kg (304 lb)   08/17/19 (!) 139 kg (306 lb)   06/03/19 (!) 142 kg (314 lb)       Pt comes in today with c/o bald patch on left side of scalp. Not sure when it happened. Just recently noticed it. No itching.   When asked about medication changes, she states she stopped all her meds a while ago. Stopped them due to cost and switching insurance. Used to take synthroid and metformin for prediabetes.   No diet changes.   Has been under a lot of stress lately. Stress with work, covid, kids at home, etc.   She is worried about autoimmune disorders and thyroid.        The following portions of the patient's history were reviewed and updated as appropriate: allergies, current medications, past family history, past medical history, past social history, past surgical history and problem list.    Review of Systems   Constitutional: Negative for activity change, appetite change, unexpected weight gain and unexpected weight loss.   Endocrine: Negative for cold intolerance, heat intolerance, polydipsia and polyuria.   Skin: Negative for dry skin.   Neurological: Negative for headache.   Psychiatric/Behavioral: Positive for stress. Negative for depressed mood. The patient is not nervous/anxious.        Objective   Physical Exam   Constitutional: She is oriented to person, place, and time. She appears well-developed and well-nourished.   Eyes: Pupils are equal, round, and reactive to light.   Cardiovascular: Normal rate and regular rhythm.   Pulmonary/Chest: Effort normal and breath sounds normal.   Neurological: She is " alert and oriented to person, place, and time.   Skin:   5.5x 4.5 cm bald patch to left parietal area of scalp.    Psychiatric: She has a normal mood and affect. Her behavior is normal.         Diagnoses and all orders for this visit:    1. Patchy loss of hair (Primary)  -     CBC & Differential; Future  -     Comprehensive Metabolic Panel; Future  -     TSH; Future  -     Vitamin D 25 Hydroxy; Future  -     Vitamin B12; Future  -     Hemoglobin A1c; Future  -     Ferritin; Future  -     T3; Future  -     T4, Free; Future  -     RENNY; Future    2. Other specified hypothyroidism  -     TSH; Future  -     T3; Future  -     T4, Free; Future    3. Prediabetes    will check labs  May need referral to derm  During this office visit, we discussed the pertinent aspects of the visit and treatment recommendations. Pt verbalizes understanding. Follow up was discussed. Patient was given the opportunity to ask questions and discuss other concerns.   Discussed importance of regular exercise and recommended starting or continuing a regular exercise program for good health. The patient was also encouraged to lose weight for better health.       Return if symptoms worsen or fail to improve.

## 2020-05-07 NOTE — PROGRESS NOTES
2 things that labs showed  1. Thryoid is definitely out of normal range. We need to restart your synthroid.   Please call in synthroid 75mcg daily. Needs to be taken in the morning 30-45 min before taking anything else.   We will need to repeat TSH in 2 months    2. Her Vit D is extremely low. Start drisdol 04134 units weekly.     I am still waiting on the autoimmune test to come back.

## 2020-05-08 ENCOUNTER — TELEPHONE (OUTPATIENT)
Dept: FAMILY MEDICINE CLINIC | Facility: CLINIC | Age: 35
End: 2020-05-08

## 2020-05-08 DIAGNOSIS — E03.9 HYPOTHYROIDISM, UNSPECIFIED TYPE: Primary | ICD-10-CM

## 2020-05-08 DIAGNOSIS — E55.9 VITAMIN D DEFICIENCY: ICD-10-CM

## 2020-05-08 LAB — ANA SER QL: NEGATIVE

## 2020-05-08 RX ORDER — ERGOCALCIFEROL 1.25 MG/1
50000 CAPSULE ORAL WEEKLY
Qty: 12 CAPSULE | Refills: 0 | Status: SHIPPED | OUTPATIENT
Start: 2020-05-08 | End: 2020-05-21 | Stop reason: SDUPTHER

## 2020-05-08 NOTE — TELEPHONE ENCOUNTER
PT CALLING IN FOR HER LAB RESULTS FROM 5/6. PT IS AT WORK BUT WILL BE ON BREAK 2 AND THEN OFF WORK AT 3:30PM    PLEASE CALL AND ADVISE -[phone]

## 2020-05-08 NOTE — TELEPHONE ENCOUNTER
----- Message from ARIAS Matthews sent at 5/7/2020  8:25 AM EDT -----  2 things that labs showed  1. Thryoid is definitely out of normal range. We need to restart your synthroid.   Please call in synthroid 75mcg daily. Needs to be taken in the morning 30-45 min before taking anything else.   We will need to repeat TSH in 2 months    2. Her Vit D is extremely low. Start drisdol 03751 units weekly.     I am still waiting on the autoimmune test to come back.

## 2020-05-08 NOTE — TELEPHONE ENCOUNTER
----- Message from ARIAS Matthews sent at 5/8/2020  3:31 PM EDT -----  Let pt know if RENNY was negative.

## 2020-05-21 ENCOUNTER — TELEPHONE (OUTPATIENT)
Dept: FAMILY MEDICINE CLINIC | Facility: CLINIC | Age: 35
End: 2020-05-21

## 2020-05-21 RX ORDER — ERGOCALCIFEROL 1.25 MG/1
50000 CAPSULE ORAL WEEKLY
Qty: 12 CAPSULE | Refills: 0 | OUTPATIENT
Start: 2020-05-21 | End: 2021-08-28

## 2020-05-21 NOTE — TELEPHONE ENCOUNTER
Patient called and stated that the pharmacy did not have her thyroid medication. They had recevied the vitamin d but not the synthroid.     Patient callback [phone]

## 2020-05-21 NOTE — TELEPHONE ENCOUNTER
PATIENT REPORTS SHE CAME INTO THE OFFICE INITIALLY BECAUSE SHE HAS A BALD SPOT. SHE WS UNCLEAR ON EWHAT THE PLAN WAS FOR THAT. SHE DIDN'T KNOW IF HER THYROID WAS THE REASON OR IF IT WAS STRESS. SHE JUST WOULD LIKE SOME CLARIFICATION ON WHAT THE PLAN IS FOR IT.     PATIENT CALLBACK [phone]

## 2020-05-27 ENCOUNTER — TELEPHONE (OUTPATIENT)
Dept: FAMILY MEDICINE CLINIC | Facility: CLINIC | Age: 35
End: 2020-05-27

## 2020-05-27 RX ORDER — LEVOTHYROXINE SODIUM 0.07 MG/1
75 TABLET ORAL DAILY
Qty: 90 TABLET | Refills: 1 | OUTPATIENT
Start: 2020-05-27 | End: 2021-08-28

## 2020-05-27 NOTE — TELEPHONE ENCOUNTER
PT IS CALLING UPSET ABOUT HER RX FOR A THYROID MEDICATION NOT BEING SENT TO THE PHARMACY AFTER WEEKS OF BEING TOLD THAT IT IS BEING SENT OVER.

## 2020-07-13 ENCOUNTER — TELEPHONE (OUTPATIENT)
Dept: FAMILY MEDICINE CLINIC | Facility: CLINIC | Age: 35
End: 2020-07-13

## 2020-07-13 NOTE — TELEPHONE ENCOUNTER
PATIENT STATED SHE CAME IN FOR HAIR LOSS ON MAY 23 AND STATES SHE IS STILL LOOSING HAIR . SHE WOULD LIKE A CALL BACK CONCERNING WHAT HER NEXT STEP SHOULD BE.    PLEASE ADVISE    [phone] (H)

## 2020-07-13 NOTE — TELEPHONE ENCOUNTER
I would like for her to see derm. We had talked about a dermatology referral at the time of her appt. I will put a referral in for her to see Dr. Foster. She can also go ahead and call her office and make an appt and let them know we referred her. Her office number is [phone]    Please also confirm that she restarted her synthroid.

## 2020-07-14 ENCOUNTER — TELEPHONE (OUTPATIENT)
Dept: FAMILY MEDICINE CLINIC | Facility: CLINIC | Age: 35
End: 2020-07-14

## 2020-07-14 DIAGNOSIS — L65.9 PATCHY LOSS OF HAIR: Primary | ICD-10-CM

## 2020-07-14 NOTE — TELEPHONE ENCOUNTER
PATIENT CALLED AND STATES A DERMATOLOGY REFERRAL WAS PLACE FOR  AND THEY ARE NOT TAKING PATIENTS UNTIL THE END OF THE YEAR. PLEASE SEND REFERRAL TO     DR. BAE  [address]  [place]   PHONE NUMBER  [phone]    SHE HAS AN APPOINTMENT ON 7/21/2020 AT 1:30

## 2020-08-17 PROCEDURE — 87081 CULTURE SCREEN ONLY: CPT | Performed by: NURSE PRACTITIONER

## 2020-08-17 PROCEDURE — 87147 CULTURE TYPE IMMUNOLOGIC: CPT | Performed by: NURSE PRACTITIONER

## 2020-08-17 PROCEDURE — U0003 INFECTIOUS AGENT DETECTION BY NUCLEIC ACID (DNA OR RNA); SEVERE ACUTE RESPIRATORY SYNDROME CORONAVIRUS 2 (SARS-COV-2) (CORONAVIRUS DISEASE [COVID-19]), AMPLIFIED PROBE TECHNIQUE, MAKING USE OF HIGH THROUGHPUT TECHNOLOGIES AS DESCRIBED BY CMS-2020-01-R: HCPCS | Performed by: NURSE PRACTITIONER

## 2021-08-28 PROBLEM — Z20.822 EXPOSURE TO COVID-19 VIRUS: Status: ACTIVE | Noted: 2021-08-28

## 2021-08-28 PROCEDURE — U0004 COV-19 TEST NON-CDC HGH THRU: HCPCS | Performed by: NURSE PRACTITIONER

## 2022-01-18 ENCOUNTER — HOSPITAL ENCOUNTER (EMERGENCY)
Facility: HOSPITAL | Age: 37
Discharge: HOME | End: 2022-01-19
Admitting: EMERGENCY MEDICINE

## 2022-01-18 DIAGNOSIS — S63.501A SPRAIN OF RIGHT WRIST, INITIAL ENCOUNTER: Primary | ICD-10-CM

## 2022-01-18 DIAGNOSIS — M25.531 RIGHT WRIST PAIN: ICD-10-CM

## 2022-01-18 PROCEDURE — 99283 EMERGENCY DEPT VISIT LOW MDM: CPT

## 2022-01-19 ENCOUNTER — APPOINTMENT (OUTPATIENT)
Dept: GENERAL RADIOLOGY | Facility: HOSPITAL | Age: 37
End: 2022-01-19

## 2022-01-19 VITALS
TEMPERATURE: 97.9 F | RESPIRATION RATE: 15 BRPM | SYSTOLIC BLOOD PRESSURE: 142 MMHG | OXYGEN SATURATION: 99 % | HEIGHT: 68 IN | BODY MASS INDEX: 44.41 KG/M2 | WEIGHT: 293 LBS | DIASTOLIC BLOOD PRESSURE: 86 MMHG | HEART RATE: 70 BPM

## 2022-01-19 PROCEDURE — 73110 X-RAY EXAM OF WRIST: CPT

## 2022-01-19 RX ORDER — ACETAMINOPHEN 500 MG
1000 TABLET ORAL ONCE
Status: COMPLETED | OUTPATIENT
Start: 2022-01-19 | End: 2022-01-19

## 2022-01-19 RX ADMIN — ACETAMINOPHEN 1000 MG: 500 TABLET, FILM COATED ORAL at 00:22

## 2022-01-19 NOTE — ED PROVIDER NOTES
"Subjective   36-year-old female presents with complaint of right distal ulna pain status post \"I grabbed a hold of railing when a trip down the stairs\".  She denies previous fracture.  She reports that it feels like she \"sprained it\".  She is right-hand dominant.    1. Location: Right distal ulna  2. Quality: Sprain  3. Severity: Mild  4. Worsening factors: Flexion  5. Alleviating factors: Rest  6. Onset: This morning  7. Radiation: Denies  8. Frequency: Intermittent  9. Co-morbidities: Past Medical History:  08/28/2021: Exposure to COVID-19 virus  10. Source: Patient            Review of Systems   Musculoskeletal: Positive for arthralgias. Negative for myalgias.   Skin: Negative for color change, pallor, rash and wound.   Neurological: Negative for weakness and numbness.   Hematological: Does not bruise/bleed easily.   All other systems reviewed and are negative.      Past Medical History:   Diagnosis Date   • Exposure to COVID-19 virus 08/28/2021       No Known Allergies    History reviewed. No pertinent surgical history.    History reviewed. No pertinent family history.    Social History     Socioeconomic History   • Marital status: Single   Tobacco Use   • Smoking status: Former Smoker     Types: Cigarettes   • Smokeless tobacco: Never Used   Substance and Sexual Activity   • Alcohol use: Yes   • Drug use: Never           Objective   Physical Exam  Vitals and nursing note reviewed.   Constitutional:       General: She is awake. She is not in acute distress.     Appearance: Normal appearance. She is well-developed. She is obese.   Cardiovascular:      Pulses: Normal pulses.           Radial pulses are 2+ on the right side and 2+ on the left side.   Musculoskeletal:         General: Tenderness present. No deformity. Normal range of motion.      Right wrist: Tenderness present. No swelling, deformity or bony tenderness. Normal range of motion. Normal pulse.      Left wrist: Normal.        Arms:       Comments: " "Point tenderness noted of the distal ulna.  There is no obvious deformity noted nor ecchymosis.  Motor function and sensation intact.  Cap refill less than 2 seconds.   Skin:     General: Skin is warm and dry.      Capillary Refill: Capillary refill takes less than 2 seconds.      Coloration: Skin is not pale.   Neurological:      Mental Status: She is alert and oriented to person, place, and time.      Sensory: No sensory deficit.      Motor: No abnormal muscle tone.   Psychiatric:         Mood and Affect: Mood normal.         Behavior: Behavior normal. Behavior is cooperative.         Thought Content: Thought content normal.         Judgment: Judgment normal.         Procedures           ED Course      XR Wrist 3+ View Right   ED Interpretation   No acute osseous abnormalities.  Interpreted by Dr. Murillo and reviewed by me.        No radiology results for the last day  Medications   acetaminophen (TYLENOL) tablet 1,000 mg (1,000 mg Oral Given 1/19/22 0022)     Labs Reviewed - No data to display                                             MDM  Number of Diagnoses or Management Options  Diagnosis management comments: Chart Review: 8/28/2021 patient was seen in urgent care for COVID exposure.  Comorbidity: Past Medical History:  08/28/2021: Exposure to COVID-19 virus  Imaging: Was interpreted by physician and reviewed by myself: XR Wrist 3+ View Right   ED Interpretation    No acute osseous abnormalities.  Interpreted by Dr. Murillo and reviewed by me.    Patient undressed and placed in gown for exam.  Appropriate PPE worn during patient exam.36-year-old female presents with complaint of right distal ulna pain status post \"I grabbed a hold of railing when a trip down the stairs\".  She denies previous fracture.  She reports that it feels like she \"sprained it\".  She is right-hand dominant. Point tenderness noted of the distal ulna.  There is no obvious deformity noted nor ecchymosis.  Motor function and sensation " intact.  Cap refill less than 2 seconds.  Patient was given Tylenol 1 g p.o. and ice pack applied.  X-ray was obtained of the right wrist with the above findings.  No acute findings on imaging.  Ace wrap was applied for comfort.  Patient was instructed to rotate Tylenol Motrin for pain and apply ice every 2 hours while awake.    Disposition/Treatment: Discussed results with patient, verbalized understanding.  Discussed reasons to return to the ER, patient verbalized understanding.  Agreeable with plan of care.  Patient was stable upon discharge.       Part of this note may be an electronic transcription/translation of spoken language to printed text using the Dragon Dictation System.         Patient Progress  Patient progress: stable      Final diagnoses:   Sprain of right wrist, initial encounter   Right wrist pain       ED Disposition  ED Disposition     ED Disposition Condition Comment    Discharge            No follow-up provider specified.       Medication List      No changes were made to your prescriptions during this visit.          Gabriela Hill, APRN  01/19/22 0440

## 2022-08-24 ENCOUNTER — TELEPHONE (OUTPATIENT)
Dept: FAMILY MEDICINE CLINIC | Facility: CLINIC | Age: 37
End: 2022-08-24

## 2022-08-24 NOTE — TELEPHONE ENCOUNTER
Patient has not been seen since 2020, her immunization record that we have on file is very limited, but she can stop by anytime to pick one up.

## 2022-08-24 NOTE — TELEPHONE ENCOUNTER
Caller: Ashly Garay    Relationship: Self    Best call back number: [phone]    What form or medical record are you requesting: IMMUNIZATION RECORDS    Who is requesting this form or medical record from you: [employer]    How would you like to receive the form or medical records (pick-up, mail, fax):    If fax, what is the fax number  If mail, what is the address:  If pick-up, provide patient with address and location details    Timeframe paperwork needed: AS SOON AS POSSIBLE    Additional notes: WILL NEED THIS TODAY 8/24/22. BETWEEN 12-1:30 WILL NEED TO .

## 2022-10-17 ENCOUNTER — HOSPITAL ENCOUNTER (EMERGENCY)
Facility: HOSPITAL | Age: 37
Discharge: HOME | End: 2022-10-17
Attending: EMERGENCY MEDICINE | Admitting: EMERGENCY MEDICINE

## 2022-10-17 ENCOUNTER — APPOINTMENT (OUTPATIENT)
Dept: GENERAL RADIOLOGY | Facility: HOSPITAL | Age: 37
End: 2022-10-17

## 2022-10-17 VITALS
SYSTOLIC BLOOD PRESSURE: 136 MMHG | RESPIRATION RATE: 16 BRPM | WEIGHT: 293 LBS | HEART RATE: 65 BPM | HEIGHT: 69 IN | OXYGEN SATURATION: 97 % | BODY MASS INDEX: 43.4 KG/M2 | DIASTOLIC BLOOD PRESSURE: 89 MMHG | TEMPERATURE: 97.2 F

## 2022-10-17 DIAGNOSIS — S93.401A SPRAIN OF RIGHT ANKLE, UNSPECIFIED LIGAMENT, INITIAL ENCOUNTER: Primary | ICD-10-CM
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
## 2022-10-17 PROCEDURE — 99283 EMERGENCY DEPT VISIT LOW MDM: CPT

## 2022-10-17 PROCEDURE — 73610 X-RAY EXAM OF ANKLE: CPT

## 2022-10-17 RX ORDER — IBUPROFEN 400 MG/1
800 TABLET ORAL ONCE
Status: COMPLETED | OUTPATIENT
Start: 2022-10-17 | End: 2022-10-17

## 2022-10-17 RX ADMIN — IBUPROFEN 800 MG: 400 TABLET, FILM COATED ORAL at 09:17

## 2022-10-17 NOTE — DISCHARGE INSTRUCTIONS
Ice to the area 20 minutes at a time several times a day, elevation  Tylenol or ibuprofen as needed for discomfort  Can also wear Ace wrap for comfort    Follow-up with primary care  Follow-up with Ortho as needed, phone number provided in your discharge instructions    Turn to the ER for new or worsening symptoms

## 2022-10-17 NOTE — ED PROVIDER NOTES
Subjective   History of Present Illness  Chief Complaint: Right ankle pain      HPI: Patient is a 37-year-old female presents to the ER today by private vehicle complaining of right ankle pain.  States that she was hiking approximately 2 days ago when she slipped rolling her right ankle, has pain with flexion as well as at the lateral aspect.  She has no numbness or tingling.  She has been ambulating since the incident. Denies previous surgeries to the extremity.  States she just wants to make sure its a strain and not a fracture.     PCP: Lakisha        Review of Systems   HENT: Negative.    Respiratory: Negative.    Cardiovascular: Negative.    Gastrointestinal: Negative.    Genitourinary: Negative.    Musculoskeletal: Positive for arthralgias.   Skin: Positive for color change.   Psychiatric/Behavioral: Negative.        Past Medical History:   Diagnosis Date   • Exposure to COVID-19 virus 08/28/2021       No Known Allergies    No past surgical history on file.    No family history on file.    Social History     Socioeconomic History   • Marital status: Single   Tobacco Use   • Smoking status: Former     Types: Cigarettes   • Smokeless tobacco: Never   Substance and Sexual Activity   • Alcohol use: Yes   • Drug use: Never           Objective   Physical Exam  Vitals reviewed.   Constitutional:       Appearance: She is obese. She is not ill-appearing or toxic-appearing.   HENT:      Head: Normocephalic.   Eyes:      Pupils: Pupils are equal, round, and reactive to light.   Cardiovascular:      Rate and Rhythm: Normal rate.      Pulses: Normal pulses.   Pulmonary:      Effort: Pulmonary effort is normal.   Musculoskeletal:         General: Tenderness present.      Cervical back: Neck supple. No rigidity.      Right ankle: Ecchymosis present. Tenderness present over the lateral malleolus. Normal range of motion. Normal pulse.      Right Achilles Tendon: No tenderness. Mills's test negative.      Left ankle: Normal.  "     Right foot: Normal.        Legs:       Comments: bilateral lower extremity strength 5/5    Neurological:      Mental Status: She is alert.         Procedures           ED Course      /72   Pulse 67   Temp 97.2 °F (36.2 °C)   Resp 16   Ht 175.3 cm (69\")   Wt (!) 136 kg (300 lb 11.3 oz)   LMP 10/14/2022 (Approximate)   SpO2 100%   BMI 44.41 kg/m²   Labs Reviewed - No data to display  Medications   ibuprofen (ADVIL,MOTRIN) tablet 800 mg (has no administration in time range)     XR Ankle 3+ View Right    Result Date: 10/17/2022  Soft tissue swelling with no fractures identified.  Electronically Signed By-Jeremy Stanford MD On:10/17/2022 8:51 AM This report was finalized on [id number] by  Jeremy Stanford MD.                                         MDM  Number of Diagnoses or Management Options  Fall, initial encounter  Sprain of right ankle, unspecified ligament, initial encounter  Diagnosis management comments: While in the emergency room above evaluation was completed and imaging was obtained, negative for acute fracture.  Patient was placed in an Ace wrap we discussed Tylenol and ibuprofen rest and ice with elevation.  Patient gave verbal understanding of these instructions.  She was given a work note for today and tomorrow also given the phone number for orthopedist to follow-up with as needed.  Patient gave verbal understanding of the instructions, she was alert oriented nontoxic in appearance at time of discharge no further questions.    I spoke with the patient at the bedside regarding their plan of care, discharge instruction, home care, prescriptions, indications to return to the emergency department, and importance follow-up.  We discussed test results at the bedside, including incidental abnormal labs, radiological findings, understands need for follow-up with primary care or specialist if indicated.     Pt is aware that discharge does not mean that nothing is wrong but it indicates no emergency " is present and they must continue care with follow-up as given below or physician of their choice    Radiology Studies:  Reviewed by myself, Read by Radiologist.  Chart review: 1/2022 Wrist sprain    Comorbidities: none reported    Differentials: sprain, strain, fracture not all inclusive of differentials considered    Appropriate PPE worn throughout the care of this patient.        Risk of Complications, Morbidity, and/or Mortality  Presenting problems: high    Patient Progress  Patient progress: stable      Final diagnoses:   Sprain of right ankle, unspecified ligament, initial encounter   Fall, initial encounter       ED Disposition  ED Disposition     ED Disposition   Discharge    Condition   Stable    Comment   --             Teresita Banuelos, APRN  [address]  [address]  [place] [address]  [phone]    Call in 1 week  As needed    Jeremy Fabian MD  [address] [place] [address] [place] [id number] [phone]    Call in 1 week  As needed, If symptoms worsen         Medication List      No changes were made to your prescriptions during this visit.          Ebony Mckeon, APRN  10/17/22 0905

## 2023-01-30 PROCEDURE — 87081 CULTURE SCREEN ONLY: CPT | Performed by: NURSE PRACTITIONER

## 2023-01-30 PROCEDURE — 87147 CULTURE TYPE IMMUNOLOGIC: CPT | Performed by: NURSE PRACTITIONER

## 2023-02-27 ENCOUNTER — OFFICE VISIT (OUTPATIENT)
Dept: FAMILY MEDICINE CLINIC | Facility: CLINIC | Age: 38
End: 2023-02-27
Payer: MEDICAID

## 2023-02-27 VITALS
WEIGHT: 293 LBS | DIASTOLIC BLOOD PRESSURE: 82 MMHG | OXYGEN SATURATION: 99 % | BODY MASS INDEX: 44.41 KG/M2 | SYSTOLIC BLOOD PRESSURE: 132 MMHG | HEART RATE: 96 BPM | TEMPERATURE: 96.9 F | HEIGHT: 68 IN | RESPIRATION RATE: 18 BRPM

## 2023-02-27 DIAGNOSIS — L70.9 ACNE, UNSPECIFIED ACNE TYPE: ICD-10-CM

## 2023-02-27 DIAGNOSIS — L65.9 HAIR LOSS: ICD-10-CM

## 2023-02-27 DIAGNOSIS — R73.03 PREDIABETES: Primary | ICD-10-CM

## 2023-02-27 DIAGNOSIS — D22.9 ATYPICAL NEVUS: ICD-10-CM

## 2023-02-27 DIAGNOSIS — G47.00 INSOMNIA, UNSPECIFIED TYPE: ICD-10-CM

## 2023-02-27 DIAGNOSIS — F33.41 MAJOR DEPRESSIVE DISORDER, RECURRENT EPISODE, IN PARTIAL REMISSION: ICD-10-CM

## 2023-02-27 DIAGNOSIS — E03.8 OTHER SPECIFIED HYPOTHYROIDISM: ICD-10-CM

## 2023-02-27 DIAGNOSIS — N92.6 IRREGULAR PERIODS/MENSTRUAL CYCLES: ICD-10-CM

## 2023-02-27 DIAGNOSIS — R73.9 HYPERGLYCEMIA: ICD-10-CM

## 2023-02-27 DIAGNOSIS — Z76.89 ENCOUNTER TO ESTABLISH CARE: ICD-10-CM

## 2023-02-27 DIAGNOSIS — Z13.220 SCREENING FOR HYPERLIPIDEMIA: ICD-10-CM

## 2023-02-27 DIAGNOSIS — E03.8 OTHER SPECIFIED HYPOTHYROIDISM: Primary | ICD-10-CM

## 2023-02-27 LAB
BILIRUB BLD-MCNC: NEGATIVE MG/DL
CLARITY, POC: ABNORMAL
COLOR UR: YELLOW
EXPIRATION DATE: NORMAL
GLUCOSE UR STRIP-MCNC: NEGATIVE MG/DL
HBA1C MFR BLD: 6.4 %
KETONES UR QL: ABNORMAL
LEUKOCYTE EST, POC: ABNORMAL
Lab: 556
NITRITE UR-MCNC: NEGATIVE MG/ML
PH UR: 5 [PH] (ref 5–8)
PROT UR STRIP-MCNC: ABNORMAL MG/DL
RBC # UR STRIP: ABNORMAL /UL
SP GR UR: 1.03 (ref 1–1.03)
UROBILINOGEN UR QL: ABNORMAL

## 2023-02-27 PROCEDURE — 3044F HG A1C LEVEL LT 7.0%: CPT | Performed by: STUDENT IN AN ORGANIZED HEALTH CARE EDUCATION/TRAINING PROGRAM

## 2023-02-27 PROCEDURE — 83036 HEMOGLOBIN GLYCOSYLATED A1C: CPT | Performed by: STUDENT IN AN ORGANIZED HEALTH CARE EDUCATION/TRAINING PROGRAM

## 2023-02-27 PROCEDURE — 99214 OFFICE O/P EST MOD 30 MIN: CPT | Performed by: STUDENT IN AN ORGANIZED HEALTH CARE EDUCATION/TRAINING PROGRAM

## 2023-02-27 RX ORDER — METFORMIN HYDROCHLORIDE 500 MG/1
500 TABLET, EXTENDED RELEASE ORAL
Qty: 30 TABLET | Refills: 3 | Status: SHIPPED | OUTPATIENT
Start: 2023-02-27

## 2023-02-27 RX ORDER — HYDROXYZINE HYDROCHLORIDE 25 MG/1
25 TABLET, FILM COATED ORAL 3 TIMES DAILY PRN
Qty: 90 TABLET | Refills: 0 | Status: SHIPPED | OUTPATIENT
Start: 2023-02-27

## 2023-02-27 RX ORDER — HYDROXYZINE HYDROCHLORIDE 25 MG/1
25 TABLET, FILM COATED ORAL 3 TIMES DAILY PRN
Qty: 90 TABLET | Refills: 0 | Status: SHIPPED | OUTPATIENT
Start: 2023-02-27 | End: 2023-02-27 | Stop reason: SDUPTHER

## 2023-02-27 RX ORDER — METFORMIN HYDROCHLORIDE 500 MG/1
500 TABLET, EXTENDED RELEASE ORAL
Qty: 90 TABLET | Refills: 3 | Status: SHIPPED | OUTPATIENT
Start: 2023-02-27 | End: 2023-02-27 | Stop reason: SDUPTHER

## 2023-02-27 NOTE — PROGRESS NOTES
"Chief Complaint  Hypothyroidism    Subjective        Ashly Garay presents to [facility]  Hypothyroidism  This is a chronic problem. The current episode started more than 1 year ago. The problem has been unchanged. Associated symptoms include vertigo. Pertinent negatives include no chest pain, chills, congestion, coughing, headaches, myalgias, visual change or vomiting. Nothing aggravates the symptoms. She has tried nothing for the symptoms. The treatment provided no relief.       Objective   Vital Signs:  /82 (BP Location: Left arm, Patient Position: Sitting, Cuff Size: Large Adult)   Pulse 96   Temp 96.9 °F (36.1 °C)   Resp 18   Ht 172.7 cm (68\")   Wt 134 kg (294 lb 6.4 oz)   SpO2 99%   BMI 44.76 kg/m²   Estimated body mass index is 44.76 kg/m² as calculated from the following:    Height as of this encounter: 172.7 cm (68\").    Weight as of this encounter: 134 kg (294 lb 6.4 oz).             Physical Exam  Vitals reviewed.   Constitutional:       Appearance: Normal appearance. She is obese.   HENT:      Head: Normocephalic and atraumatic.      Right Ear: Ear canal normal.      Left Ear: Ear canal normal.      Ears:      Comments: Serous effusion appreciated bilaterally     Mouth/Throat:      Mouth: Mucous membranes are moist.      Pharynx: Oropharynx is clear.   Eyes:      General: No scleral icterus.     Extraocular Movements: Extraocular movements intact.      Conjunctiva/sclera: Conjunctivae normal.      Pupils: Pupils are equal, round, and reactive to light.   Neck:      Comments: Mild slightly tender to palpation right sided anterior cervical adenopathy  Cardiovascular:      Rate and Rhythm: Normal rate and regular rhythm.      Heart sounds:     No friction rub. No gallop.   Pulmonary:      Effort: Pulmonary effort is normal. No respiratory distress.      Breath sounds: Normal breath sounds. No wheezing.   Abdominal:      General: There is no distension.      " Palpations: Abdomen is soft.      Tenderness: There is no abdominal tenderness.   Musculoskeletal:         General: No swelling, tenderness or deformity.      Cervical back: Normal range of motion. No rigidity or tenderness.   Lymphadenopathy:      Cervical: Cervical adenopathy present.   Skin:     General: Skin is warm.      Findings: No lesion or rash.      Comments: Skin tags mid back, however they do not appear suspicious at this time  She does have some atypical nevi on the right side of her neck they are uniform in shape color well-circumscribed although they are about the size of a pencil eraser   Neurological:      General: No focal deficit present.      Mental Status: She is alert and oriented to person, place, and time. Mental status is at baseline.      Gait: Gait normal.   Psychiatric:         Behavior: Behavior normal.         Thought Content: Thought content normal.      Comments: Kalyanies KHALIF RITTER currently seeing a therapist        Result Review :                   Assessment and Plan   Diagnoses and all orders for this visit:    1. Other specified hypothyroidism (Primary)  -     TSH  -     T4, Free  -     T3, Free  -     Thyroid Peroxidase Antibody    2. Hyperglycemia  -     POC Glycosylated Hemoglobin (Hb A1C)  -     Comprehensive Metabolic Panel  -     CBC & Differential    3. Irregular periods/menstrual cycles  -     Ambulatory Referral to Obstetrics / Gynecology    4. Screening for hyperlipidemia  -     Lipid Panel    5. Atypical nevus    6. Major depressive disorder, recurrent episode, in partial remission (HCC)    7. Insomnia, unspecified type    8. Hair loss    9. Acne, unspecified acne type  -     tretinoin (Retin-A) 0.025 % cream; Apply 1 application topically to the appropriate area as directed Every Night.  Dispense: 20 g; Refill: 1    10. Encounter to establish care    Other orders  -     metFORMIN ER (GLUCOPHAGE-XR) 500 MG 24 hr tablet; Take 1 tablet by mouth Daily With Breakfast.  Dispense:  90 tablet; Refill: 3  -     hydrOXYzine (ATARAX) 25 MG tablet; Take 1 tablet by mouth 3 (Three) Times a Day As Needed for Itching.  Dispense: 90 tablet; Refill: 0    1C is 6.4 I will start the patient prophylactically on metformin  I would like to follow-up with her regarding her lab results and we will call her with results  For her adult acne I have prescribed Retin-A patient does have a history of having her tubes tied  For her sleep disorder I am going to start a trial of hydroxyzine and had like to follow-up with the patient in about 3 to 6 months and we will reevaluate if needed         Follow Up   No follow-ups on file.  Patient was given instructions and counseling regarding her condition or for health maintenance advice. Please see specific information pulled into the AVS if appropriate.

## 2023-03-01 ENCOUNTER — TELEPHONE (OUTPATIENT)
Dept: FAMILY MEDICINE CLINIC | Facility: CLINIC | Age: 38
End: 2023-03-01
Payer: MEDICAID

## 2023-03-01 LAB
ALBUMIN SERPL-MCNC: 4.3 G/DL (ref 3.8–4.8)
ALBUMIN/GLOB SERPL: 1.6 {RATIO} (ref 1.2–2.2)
ALP SERPL-CCNC: 55 IU/L (ref 44–121)
ALT SERPL-CCNC: 13 IU/L (ref 0–32)
AST SERPL-CCNC: 12 IU/L (ref 0–40)
BACTERIA UR CULT: NORMAL
BACTERIA UR CULT: NORMAL
BASOPHILS # BLD AUTO: 0 X10E3/UL (ref 0–0.2)
BASOPHILS NFR BLD AUTO: 1 %
BILIRUB SERPL-MCNC: 0.4 MG/DL (ref 0–1.2)
BUN SERPL-MCNC: 12 MG/DL (ref 6–20)
BUN/CREAT SERPL: 18 (ref 9–23)
CALCIUM SERPL-MCNC: 9.4 MG/DL (ref 8.7–10.2)
CHLORIDE SERPL-SCNC: 103 MMOL/L (ref 96–106)
CHOLEST SERPL-MCNC: 156 MG/DL (ref 100–199)
CO2 SERPL-SCNC: 22 MMOL/L (ref 20–29)
CREAT SERPL-MCNC: 0.67 MG/DL (ref 0.57–1)
EGFRCR SERPLBLD CKD-EPI 2021: 115 ML/MIN/1.73
EOSINOPHIL # BLD AUTO: 0.1 X10E3/UL (ref 0–0.4)
EOSINOPHIL NFR BLD AUTO: 2 %
ERYTHROCYTE [DISTWIDTH] IN BLOOD BY AUTOMATED COUNT: 13 % (ref 11.7–15.4)
GLOBULIN SER CALC-MCNC: 2.7 G/DL (ref 1.5–4.5)
GLUCOSE SERPL-MCNC: 106 MG/DL (ref 70–99)
HCT VFR BLD AUTO: 40.2 % (ref 34–46.6)
HDLC SERPL-MCNC: 36 MG/DL
HGB BLD-MCNC: 13.6 G/DL (ref 11.1–15.9)
IMM GRANULOCYTES # BLD AUTO: 0 X10E3/UL (ref 0–0.1)
IMM GRANULOCYTES NFR BLD AUTO: 0 %
LDLC SERPL CALC-MCNC: 100 MG/DL (ref 0–99)
LYMPHOCYTES # BLD AUTO: 3 X10E3/UL (ref 0.7–3.1)
LYMPHOCYTES NFR BLD AUTO: 45 %
MCH RBC QN AUTO: 29.8 PG (ref 26.6–33)
MCHC RBC AUTO-ENTMCNC: 33.8 G/DL (ref 31.5–35.7)
MCV RBC AUTO: 88 FL (ref 79–97)
MONOCYTES # BLD AUTO: 0.4 X10E3/UL (ref 0.1–0.9)
MONOCYTES NFR BLD AUTO: 7 %
NEUTROPHILS # BLD AUTO: 3 X10E3/UL (ref 1.4–7)
NEUTROPHILS NFR BLD AUTO: 45 %
PLATELET # BLD AUTO: 273 X10E3/UL (ref 150–450)
POTASSIUM SERPL-SCNC: 4.3 MMOL/L (ref 3.5–5.2)
PROT SERPL-MCNC: 7 G/DL (ref 6–8.5)
RBC # BLD AUTO: 4.56 X10E6/UL (ref 3.77–5.28)
SODIUM SERPL-SCNC: 139 MMOL/L (ref 134–144)
T3FREE SERPL-MCNC: 3.1 PG/ML (ref 2–4.4)
T4 FREE SERPL-MCNC: 0.85 NG/DL (ref 0.82–1.77)
THYROPEROXIDASE AB SERPL-ACNC: 94 IU/ML (ref 0–34)
TRIGL SERPL-MCNC: 109 MG/DL (ref 0–149)
TSH SERPL DL<=0.005 MIU/L-ACNC: 5.84 UIU/ML (ref 0.45–4.5)
VLDLC SERPL CALC-MCNC: 20 MG/DL (ref 5–40)
WBC # BLD AUTO: 6.6 X10E3/UL (ref 3.4–10.8)

## 2023-03-01 NOTE — TELEPHONE ENCOUNTER
" advised:\"TSH is minimally into the hypothyroid range however much improved from 2 years ago.  Additionally thyroid hormones TSH and T4 are in normal range.  Thyroid peroxidase antibody is elevated, this may indicate an autoimmune cause for hyperthyroidism such as Hashimoto's thyroiditis or Graves' disease.  Has she ever seen an endocrinologist?  I do not see levothyroxine on her medication list currently.  It appears she was prescribed 75 mcg a couple of years ago.  When was levothyroxine discontinued and for what reason?  What are her current symptoms of thyroid disease?  Ideally she would have an appointment to discuss whether further evaluation and/or treatment would be warranted at this time.     Glucose was minimally elevated and A1c was 6.4 in office, I see Dr. Montano prescribed metformin, do recommend a low concentrated sweets diet.\"    Called and left message for return call  "

## 2023-03-01 NOTE — TELEPHONE ENCOUNTER
Caller: Ashly Garay    Relationship: Self    Best call back number: [phone]    What test was performed: LABS    When was the test performed: 2/28/23    Where was the test performed: IN OFF[facility]    Additional notes: PATIENT SET UP TO ESTABLISH WITH DR. CHANG 5/5/23 AND IS CONCERNED ABOUT HER LABS BEING REVIEWED. DR. FLETCHER ORDERED LABS TO CHECK HER THYROID LEVELS SO SHE WANTS TO KNOW IF ANOTHER PROVIDER CAN REVIEW RESULTS AND GET IN TOUCH WITH HER.    PLEASE ADVISE

## 2023-03-06 ENCOUNTER — OFFICE VISIT (OUTPATIENT)
Dept: FAMILY MEDICINE CLINIC | Facility: CLINIC | Age: 38
End: 2023-03-06
Payer: MEDICAID

## 2023-03-06 VITALS
WEIGHT: 293 LBS | HEIGHT: 68 IN | OXYGEN SATURATION: 99 % | RESPIRATION RATE: 16 BRPM | DIASTOLIC BLOOD PRESSURE: 76 MMHG | TEMPERATURE: 97.7 F | HEART RATE: 71 BPM | BODY MASS INDEX: 44.41 KG/M2 | SYSTOLIC BLOOD PRESSURE: 130 MMHG

## 2023-03-06 DIAGNOSIS — E06.3 HYPOTHYROIDISM DUE TO HASHIMOTO'S THYROIDITIS: Primary | ICD-10-CM

## 2023-03-06 DIAGNOSIS — N92.6 IRREGULAR PERIODS: ICD-10-CM

## 2023-03-06 DIAGNOSIS — E03.8 HYPOTHYROIDISM DUE TO HASHIMOTO'S THYROIDITIS: Primary | ICD-10-CM

## 2023-03-06 DIAGNOSIS — G47.00 INSOMNIA, UNSPECIFIED TYPE: ICD-10-CM

## 2023-03-06 DIAGNOSIS — E66.3 OVERWEIGHT: ICD-10-CM

## 2023-03-06 DIAGNOSIS — L70.9 ACNE, UNSPECIFIED ACNE TYPE: ICD-10-CM

## 2023-03-06 DIAGNOSIS — R73.03 PREDIABETES: ICD-10-CM

## 2023-03-06 PROCEDURE — 99214 OFFICE O/P EST MOD 30 MIN: CPT | Performed by: STUDENT IN AN ORGANIZED HEALTH CARE EDUCATION/TRAINING PROGRAM

## 2023-03-06 RX ORDER — LEVOTHYROXINE SODIUM 0.03 MG/1
25 TABLET ORAL DAILY
Qty: 90 TABLET | Refills: 1 | Status: SHIPPED | OUTPATIENT
Start: 2023-03-06

## 2023-03-06 NOTE — ASSESSMENT & PLAN NOTE
Patient's (Body mass index is 44.76 kg/m².) indicates that they are overweight with health conditions that include none . Weight is unchanged. BMI is is above average; BMI management plan is completed. We discussed portion control and increasing exercise.

## 2023-03-06 NOTE — PROGRESS NOTES
"Subjective   Ashly Garay is a 37 y.o. female.   Chief Complaint   Patient presents with   • Lab review       History of Present Illness         Hashimoto hypothyroidism  -Last appointment, patient reported new issues falling and staying asleep, irregular periods, hair loss, occasional palpitations and increase in acne  -Patient states she has been on levothyroxine before for hypothyroidism.  She had to stop her levothyroxine because of insurance issues  -Patient states she has had the palpitations since she was \"young\"  -TPO elevated at 94  -TSH elevated at 5.84, T4 within normal limits but just inside normal limits (0.82), T3 normal      Prediabetes  -Last appointment, A1c came back at 6.4% (2/28/2023)  -Patient was prescribed metformin 500 mg daily  -Patient's prior PCP left practice so patient has not picked up or use any of the medication prescribed on last appointment until she followed up with new provider  -States has been on metformin before and is tolerated it well.  States she is also been on Ozempic in the past and tolerated that well too    Acne  -Was prescribed Retin-A 0.025% cream  -Patient has had a tubal ligation  -She has not tried this medication yet    Irregular periods  -Was given referral to [facility].  Referral was authorized  -Patient states she has not been called back about this referral yet  -On later inspection, looks like there were no [facility]'s in her area that took her Medicaid insurance    Insomnia  -Patient states she has issues falling and staying asleep  -Was prescribed hydroxyzine 25 mg nightly which she has not started or picked up yet    The following portions of the patient's history were reviewed and updated as appropriate: allergies, current medications, past family history, past medical history, past social history, past surgical history and problem list.    Patient Active Problem List   Diagnosis   • Enlarged thyroid   • Genital herpes simplex   • " "Prediabetes   • Exposure to COVID-19 virus   • Overweight   • Hypothyroidism due to Hashimoto's thyroiditis       Current Outpatient Medications on File Prior to Visit   Medication Sig Dispense Refill   • hydrOXYzine (ATARAX) 25 MG tablet Take 1 tablet by mouth 3 (Three) Times a Day As Needed for Itching. 90 tablet 0   • metFORMIN ER (GLUCOPHAGE-XR) 500 MG 24 hr tablet Take 1 tablet by mouth Daily With Breakfast. 30 tablet 3   • tretinoin (Retin-A) 0.025 % cream Apply 1 application topically to the appropriate area as directed Every Night. 20 g 1     No current facility-administered medications on file prior to visit.     Current outpatient and discharge medications have been reconciled for the patient.  Reviewed by: Yissel Vann DO      No Known Allergies      Objective   Visit Vitals  /76 (BP Location: Right arm, Patient Position: Sitting, Cuff Size: Large Adult)   Pulse 71   Temp 97.7 °F (36.5 °C) (Skin)   Resp 16   Ht 172.7 cm (68\")   Wt 134 kg (294 lb 6.4 oz)   LMP 02/06/2023   SpO2 99%   BMI 44.76 kg/m²       Physical Exam  HENT:      Head: Normocephalic.   Eyes:      Conjunctiva/sclera: Conjunctivae normal.   Cardiovascular:      Rate and Rhythm: Normal rate and regular rhythm.      Heart sounds: Normal heart sounds.   Pulmonary:      Effort: Pulmonary effort is normal. No respiratory distress.      Breath sounds: Normal breath sounds. No wheezing, rhonchi or rales.   Neurological:      Mental Status: She is alert.           Diagnoses and all orders for this visit:    1. Hypothyroidism due to Hashimoto's thyroiditis (Primary)  -   Discussed with patient that her TSH was elevated but her T4 was normal (technically) which put her in the category of subclinical hypothyroidism.  Discussed that we would supplement when her T4 would become low.  However, patient is very symptomatic with classical signs of hypothyroidism and her T4 is only just inside the normal limits  -Starting levothyroxine (SYNTHROID, " LEVOTHROID) 25 MCG tablet; Take 1 tablet by mouth Daily.  Dispense: 90 tablet; Refill: 1  -We will follow-up in symptoms in 1 month    2. Prediabetes  -Told patient that it was appropriate to start metformin 500 mg daily    3. Acne, unspecified acne type  -Told her it was okay to go ahead and  the Retin-A cream for her acne    4. Irregular periods  -     Ambulatory Referral to Obstetrics / Gynecology: made to Dr. Britton with [facility] as he takes Medicaid and is local    5. Insomnia, unspecified type  -Told patient she could take 2 of the 25 mg of hydroxyzine at night.  Sometimes hydroxyzine can make people drowsy enough they can use it for insomnia and this is safe  -We will follow-up in 1 month    6. Overweight  Assessment & Plan:  Patient's (Body mass index is 44.76 kg/m².) indicates that they are overweight with health conditions that include none . Weight is unchanged. BMI is is above average; BMI management plan is completed. We discussed portion control and increasing exercise.              Follow Up  - 1 month for follow-up on acne, hypothyroidism, insomnia and OB/GYN referral  - 1-2 months annual physical    Expected course, medications, and adverse effects discussed as appropriate.  Call or return if worsening or persistent symptoms.  I wore protective equipment throughout this patient encounter to include mask and eye protection. Hand hygiene was performed before donning protective equipment and after removal when leaving the room.    This document is intended for medical expert use only. Reading of this document by patients and/or patient's family without participating medical staff guidance may result in misinterpretation and unintended morbidity. Any interpretation of such data is the responsibility of the patient and/or family member responsible for the patient in concert with their primary or specialist providers, not to be left for sources of online searches such as WebMD,  myfab5 or similar queries. Relying on these approaches to knowledge may result in misinterpretation, misguided goals of care and even death should patients or family members try recommendations outside of the realm of professional medical care.

## 2023-04-10 ENCOUNTER — OFFICE VISIT (OUTPATIENT)
Dept: FAMILY MEDICINE CLINIC | Facility: CLINIC | Age: 38
End: 2023-04-10
Payer: MEDICAID

## 2023-04-10 VITALS
HEIGHT: 68 IN | SYSTOLIC BLOOD PRESSURE: 118 MMHG | TEMPERATURE: 97.8 F | RESPIRATION RATE: 16 BRPM | OXYGEN SATURATION: 99 % | HEART RATE: 71 BPM | WEIGHT: 293 LBS | BODY MASS INDEX: 44.41 KG/M2 | DIASTOLIC BLOOD PRESSURE: 68 MMHG

## 2023-04-10 DIAGNOSIS — F32.A ANXIETY AND DEPRESSION: ICD-10-CM

## 2023-04-10 DIAGNOSIS — F41.9 ANXIETY AND DEPRESSION: ICD-10-CM

## 2023-04-10 DIAGNOSIS — E03.8 HYPOTHYROIDISM DUE TO HASHIMOTO'S THYROIDITIS: Primary | ICD-10-CM

## 2023-04-10 DIAGNOSIS — E66.3 OVERWEIGHT: ICD-10-CM

## 2023-04-10 DIAGNOSIS — L70.0 ACNE VULGARIS: ICD-10-CM

## 2023-04-10 DIAGNOSIS — E06.3 HYPOTHYROIDISM DUE TO HASHIMOTO'S THYROIDITIS: Primary | ICD-10-CM

## 2023-04-10 DIAGNOSIS — G47.00 INSOMNIA, UNSPECIFIED TYPE: ICD-10-CM

## 2023-04-10 RX ORDER — ESCITALOPRAM OXALATE 10 MG/1
TABLET ORAL
Qty: 30 TABLET | Refills: 1 | Status: SHIPPED | OUTPATIENT
Start: 2023-04-10

## 2023-04-10 NOTE — ASSESSMENT & PLAN NOTE
Patient's (Body mass index is 45.37 kg/m².) indicates that they are overweight with health conditions that include none . Weight is unchanged. BMI is is above average; BMI management plan is completed. We discussed portion control and increasing exercise.

## 2023-04-10 NOTE — PROGRESS NOTES
Subjective   Ashly Garay is a 38 y.o. female.   Chief Complaint   Patient presents with   • Hypothyroidism   • Acne   • Insomnia       History of Present Illness       Hypothyroidism:  - started  Levothyroxine 25 mcg daily last appointment (3/6/2023)  - hasn't noticed any bad side effects. Noticed a little more energy, helped a little with depression    Acne:  -Was originally written a prescription for Retin-A 0.025% cream by Dr. Montano  -Patient went to fill the medication but she could not do the cost ($115)  - Patient states her trouble areas are her forehead and her chin    Insomnia:  -Patient has issues falling and staying asleep  - Try hydroxyzine 25 mg nightly last appointment  -Patient normally gets up multiple times at night but with the hydroxyzine, she only gets up once at (patient's noticed a large improvement with hydroxyzine)  -No bad side effects to this medication    Anxiety/depression  -Patient currently sees counselor.  They suggested patient reach out to her PCP in regards to pharmacological support  - Patient has never been on any medications before for anxiety or depression  - Today   PHQ-9: 9   MICHAEL-7: 15      The following portions of the patient's history were reviewed and updated as appropriate: allergies, current medications, past family history, past medical history, past social history, past surgical history and problem list.    Patient Active Problem List   Diagnosis   • Enlarged thyroid   • Genital herpes simplex   • Prediabetes   • Exposure to COVID-19 virus   • Overweight   • Hypothyroidism due to Hashimoto's thyroiditis       Current Outpatient Medications on File Prior to Visit   Medication Sig Dispense Refill   • hydrOXYzine (ATARAX) 25 MG tablet Take 1 tablet by mouth 3 (Three) Times a Day As Needed for Itching. 90 tablet 0   • levothyroxine (SYNTHROID, LEVOTHROID) 25 MCG tablet Take 1 tablet by mouth Daily. 90 tablet 1   • metFORMIN ER (GLUCOPHAGE-XR) 500 MG 24 hr tablet Take 1  "tablet by mouth Daily With Breakfast. 30 tablet 3   • [DISCONTINUED] tretinoin (Retin-A) 0.025 % cream Apply 1 application topically to the appropriate area as directed Every Night. 20 g 1     No current facility-administered medications on file prior to visit.     Current outpatient and discharge medications have been reconciled for the patient.  Reviewed by: Yissel Vann DO      No Known Allergies      Objective   Visit Vitals  /68 (BP Location: Right arm, Patient Position: Sitting, Cuff Size: Large Adult)   Pulse 71   Temp 97.8 °F (36.6 °C) (Skin)   Resp 16   Ht 172.7 cm (68\")   Wt 135 kg (298 lb 6.4 oz)   SpO2 99%   BMI 45.37 kg/m²       Physical Exam  HENT:      Head: Normocephalic and atraumatic.   Eyes:      Conjunctiva/sclera: Conjunctivae normal.   Skin:     Comments: - Patient has flesh-colored numerous blackheads across the forehead (no inflammation)   Neurological:      General: No focal deficit present.      Mental Status: She is alert and oriented to person, place, and time.   Psychiatric:         Mood and Affect: Mood normal.         Behavior: Behavior normal.           Diagnoses and all orders for this visit:    1. Hypothyroidism due to Hashimoto's thyroiditis (Primary)  -Patient already seeing improvement with levothyroxine 25 mg daily.  Has noticed improvement in her depression  - We will want to wait a few more months before recheck the level.  We will recheck her TSH and T4 in June    2. Acne vulgaris  - Recommended doing a retinol cream (No. 7 brand as that seems to moisturize well enough that is not as harsh as other brands), also recommended benzyl peroxide and adapalene for spot treatments.  Also mentioned using salicylic acid wash and brings  -Did caution patient on using an exfoliant with treatment maybe once a day but using a gentle cleanser and moisturizer the other part of the day (wash face 2x daily, don't use exfoliant both times, use only once daily) to avoid being overly " harsher skin  - Also recommended patient use sunscreen is going to retinol cream    3. Insomnia, unspecified type  -Patient has noticed an improvement with using hydroxyzine 25 mg nightly.  Told patient she can go up to hydroxyzine 50 mg nightly to see if she gets the more benefit  - Patient states she has enough hydroxyzine to last her a while    4. Anxiety and depression  -Discussed side effects, treatment course and expectations of treatment with SSRI for depression and anxiety.  Told patient that if she has life altering side effects to call me sooner than when I see her for follow-up so we can switch medications.  Told her that her side effects should improve as her body gets more accustomed to the medication  -Escitalopram (Lexapro) 10 MG tablet; Take 1/2 tablet daily for the first 7 days, then increase up to full tablet daily  Dispense: 30 tablet; Refill: 1  - Patient's daughter uses hydroxyzine for panic attacks.  Told patient she is welcome to use her oxazine that she is using for insomnia as needed for anxiety attacks/breakthrough anxiety as well    5. Overweight  Assessment & Plan:  Patient's (Body mass index is 45.37 kg/m².) indicates that they are overweight with health conditions that include none . Weight is unchanged. BMI is is above average; BMI management plan is completed. We discussed portion control and increasing exercise.          Follow Up  -1 month for anxiety/depression and insomnia    Expected course, medications, and adverse effects discussed as appropriate.  Call or return if worsening or persistent symptoms.  I wore protective equipment throughout this patient encounter to include mask and eye protection. Hand hygiene was performed before donning protective equipment and after removal when leaving the room.    This document is intended for medical expert use only. Reading of this document by patients and/or patient's family without participating medical staff guidance may result in  misinterpretation and unintended morbidity. Any interpretation of such data is the responsibility of the patient and/or family member responsible for the patient in concert with their primary or specialist providers, not to be left for sources of online searches such as [employer], [employer] or similar queries. Relying on these approaches to knowledge may result in misinterpretation, misguided goals of care and even death should patients or family members try recommendations outside of the realm of professional medical care.

## 2023-04-13 ENCOUNTER — OFFICE VISIT (OUTPATIENT)
Dept: FAMILY MEDICINE CLINIC | Facility: CLINIC | Age: 38
End: 2023-04-13
Payer: MEDICAID

## 2023-04-13 VITALS
OXYGEN SATURATION: 99 % | HEART RATE: 57 BPM | WEIGHT: 293 LBS | BODY MASS INDEX: 44.41 KG/M2 | TEMPERATURE: 98 F | HEIGHT: 68 IN | RESPIRATION RATE: 18 BRPM | DIASTOLIC BLOOD PRESSURE: 90 MMHG | SYSTOLIC BLOOD PRESSURE: 132 MMHG

## 2023-04-13 DIAGNOSIS — Z00.00 ANNUAL PHYSICAL EXAM: Primary | ICD-10-CM

## 2023-04-13 DIAGNOSIS — R30.0 DYSURIA: ICD-10-CM

## 2023-04-13 DIAGNOSIS — E66.01 CLASS 3 SEVERE OBESITY DUE TO EXCESS CALORIES WITHOUT SERIOUS COMORBIDITY WITH BODY MASS INDEX (BMI) OF 40.0 TO 44.9 IN ADULT: ICD-10-CM

## 2023-04-13 PROBLEM — E66.813 CLASS 3 SEVERE OBESITY DUE TO EXCESS CALORIES WITHOUT SERIOUS COMORBIDITY WITH BODY MASS INDEX (BMI) OF 40.0 TO 44.9 IN ADULT: Status: ACTIVE | Noted: 2023-04-13

## 2023-04-13 LAB
BILIRUB BLD-MCNC: ABNORMAL MG/DL
CLARITY, POC: CLEAR
COLOR UR: YELLOW
EXPIRATION DATE: ABNORMAL
GLUCOSE UR STRIP-MCNC: NEGATIVE MG/DL
KETONES UR QL: NEGATIVE
LEUKOCYTE EST, POC: ABNORMAL
Lab: ABNORMAL
NITRITE UR-MCNC: NEGATIVE MG/ML
PH UR: 5 [PH] (ref 5–8)
PROT UR STRIP-MCNC: ABNORMAL MG/DL
RBC # UR STRIP: ABNORMAL /UL
SP GR UR: 1.02 (ref 1–1.03)
UROBILINOGEN UR QL: NORMAL

## 2023-04-13 NOTE — ASSESSMENT & PLAN NOTE
Patient's (Body mass index is 44.87 kg/m².) indicates that they are morbidly/severely obese (BMI > 40 or > 35 with obesity - related health condition) with health conditions that include none . Weight is unchanged. BMI  is above average; BMI management plan is completed. We discussed portion control and increasing exercise.

## 2023-04-13 NOTE — PROGRESS NOTES
Chief Complaint  Chief Complaint   Patient presents with   • Annual Exam       Subjective    History of Present Illness        Ashly Garay presents to [facility] for   Ashly Garay is a 38 y.o. female here for her annual physical with me. Ashly is here for coordination of medical care, to discuss health maintenance, disease prevention as well as to followup on medical problems.     Annual Physical Exam  Patient's last Physical Exam was done at her work sometime last year. Patient's medical history, medications and allergy lists were reviewed and updated.  Activity level is moderate.   Exercises 4 per week.   Appetite is fair.   Feels well with few complaints.   Energy level is good.   Sleeps well.   Patient's last colonoscopy was never. No family history of colon cancer.   Patient's last mammogram was never. No family history of breast cancer.    Patient is doing routine self skin exam occasionally.   Patient is doing routine self-breast exams/Testicular exams occasionally  Patient's menstrual cycles are: Monthly and they are regular.   Last pap smear was done: Many years ago, does not remember when. She has an appointment next month with Dr. Britton         Family History   Problem Relation Age of Onset   • Hypertension Mother    • Hypertension Father    • Heart disease Father    • Diabetes Brother    • Heart disease Maternal Grandmother    • Cancer Maternal Grandmother         skin   • Diabetes Maternal Grandmother    • Diabetes Maternal Grandfather    • Hypertension Paternal Grandmother    • Heart disease Paternal Grandmother    • Cancer Paternal Grandfather         skin       Social History     Tobacco Use   • Smoking status: Former     Packs/day: 0.50     Years: 5.00     Pack years: 2.50     Types: Cigarettes     Start date: 2000     Quit date: 3/3/2005     Years since quittin.1     Passive exposure: Past   • Smokeless tobacco: Never   Vaping Use   • Vaping Use:  "Never used   Substance Use Topics   • Alcohol use: Yes     Comment: every 3 months/occasional, has 2 at one sitting   • Drug use: Never       Past Surgical History:   Procedure Laterality Date   • TUBAL ABDOMINAL LIGATION Bilateral 03/01/2009       Patient Active Problem List   Diagnosis   • Enlarged thyroid   • Genital herpes simplex   • Prediabetes   • Exposure to COVID-19 virus   • Overweight   • Hypothyroidism due to Hashimoto's thyroiditis   • Annual physical exam   • Class 3 severe obesity due to excess calories without serious comorbidity with body mass index (BMI) of 40.0 to 44.9 in adult         Current Outpatient Medications:   •  escitalopram (Lexapro) 10 MG tablet, Take 1/2 tablet daily for the first 7 days, then increase up to full tablet daily, Disp: 30 tablet, Rfl: 1  •  hydrOXYzine (ATARAX) 25 MG tablet, Take 1 tablet by mouth 3 (Three) Times a Day As Needed for Itching., Disp: 90 tablet, Rfl: 0  •  levothyroxine (SYNTHROID, LEVOTHROID) 25 MCG tablet, Take 1 tablet by mouth Daily., Disp: 90 tablet, Rfl: 1  •  metFORMIN ER (GLUCOPHAGE-XR) 500 MG 24 hr tablet, Take 1 tablet by mouth Daily With Breakfast., Disp: 30 tablet, Rfl: 3    Objective   /90 (BP Location: Right arm, Patient Position: Sitting, Cuff Size: Adult)   Pulse 57   Temp 98 °F (36.7 °C) (Temporal)   Resp 18   Ht 172.7 cm (67.99\")   Wt 134 kg (295 lb)   SpO2 99%   BMI 44.87 kg/m²   BP Readings from Last 3 Encounters:   04/13/23 132/90   04/10/23 118/68   03/06/23 130/76     Wt Readings from Last 3 Encounters:   04/13/23 134 kg (295 lb)   04/10/23 135 kg (298 lb 6.4 oz)   03/06/23 134 kg (294 lb 6.4 oz)     Physical Exam  Constitutional:       General: She is not in acute distress.     Appearance: Normal appearance. She is obese.   HENT:      Head: Normocephalic and atraumatic.      Right Ear: Tympanic membrane normal.      Left Ear: Tympanic membrane normal.      Nose: Nose normal.      Mouth/Throat:      Mouth: Mucous " membranes are moist.      Pharynx: Oropharynx is clear. No posterior oropharyngeal erythema.   Eyes:      Extraocular Movements: Extraocular movements intact.      Conjunctiva/sclera: Conjunctivae normal.   Neck:      Comments: - Thyroid not enlarged  Cardiovascular:      Rate and Rhythm: Normal rate and regular rhythm.      Heart sounds: Normal heart sounds.   Pulmonary:      Effort: Pulmonary effort is normal.      Breath sounds: Normal breath sounds. No stridor. No wheezing.   Abdominal:      General: Abdomen is flat. Bowel sounds are normal.      Palpations: Abdomen is soft. There is no mass.      Tenderness: There is no abdominal tenderness. There is no guarding.   Musculoskeletal:         General: No swelling or deformity. Normal range of motion.      Cervical back: Normal range of motion and neck supple.   Skin:     General: Skin is warm and dry.      Capillary Refill: Capillary refill takes less than 2 seconds.      Coloration: Skin is not jaundiced.      Findings: No rash.   Neurological:      General: No focal deficit present.      Mental Status: She is alert and oriented to person, place, and time.      Cranial Nerves: No cranial nerve deficit.      Motor: No weakness.      Coordination: Coordination normal.      Gait: Gait normal.      Deep Tendon Reflexes: Reflexes normal.   Psychiatric:         Mood and Affect: Mood normal.         Behavior: Behavior normal.         Thought Content: Thought content normal.             Assessment and Plan   Diagnoses and all orders for this visit:    1. Annual physical exam (Primary)  -Normal 38-year-old female exam  -Pertinent labs have already been ordered and we will recheck TSH in about June  -  POC Urinalysis Dipstick, Automated: Positive for large leukocytes, 3+ protein, small bilirubin and small blood  -Urine culture was sent out due to abnormal urinalysis    3. Class 3 severe obesity due to excess calories without serious comorbidity with body mass index (BMI)  of 40.0 to 44.9 in adult  Assessment & Plan:  Patient's (Body mass index is 44.87 kg/m².) indicates that they are morbidly/severely obese (BMI > 40 or > 35 with obesity - related health condition) with health conditions that include none . Weight is unchanged. BMI  is above average; BMI management plan is completed. We discussed portion control and increasing exercise.            Follow Up   - 1 year for annual physical exam  -We will see patient in May for anxiety check in      The patient was counseled regarding nutrition, physical activity, healthy weight, injury prevention, immunizations and preventative health screenings. Expected course, medications, and adverse effects discussed.  Call or return if worsening or persistent symptoms.  I wore protective equipment throughout this patient encounter including a mask and eye protection.   The complete contents of the Assessment and Plan and Data / Lab Results as documented above have been reviewed and addressed by myself with the patient today as part of an ongoing evaluation / treatment plan.

## 2023-04-15 LAB
BACTERIA UR CULT: NORMAL
BACTERIA UR CULT: NORMAL

## 2023-04-18 ENCOUNTER — TELEPHONE (OUTPATIENT)
Dept: FAMILY MEDICINE CLINIC | Facility: CLINIC | Age: 38
End: 2023-04-18
Payer: MEDICAID

## 2023-04-18 NOTE — TELEPHONE ENCOUNTER
----- Message from Yissel Vann DO sent at 4/18/2023  8:50 AM EDT -----  Please let patient know that her urine culture came back negative

## 2023-05-11 ENCOUNTER — OFFICE VISIT (OUTPATIENT)
Dept: FAMILY MEDICINE CLINIC | Facility: CLINIC | Age: 38
End: 2023-05-11
Payer: MEDICAID

## 2023-05-11 VITALS
RESPIRATION RATE: 18 BRPM | OXYGEN SATURATION: 98 % | HEART RATE: 77 BPM | TEMPERATURE: 96.9 F | BODY MASS INDEX: 44.41 KG/M2 | WEIGHT: 293 LBS | SYSTOLIC BLOOD PRESSURE: 130 MMHG | DIASTOLIC BLOOD PRESSURE: 92 MMHG | HEIGHT: 68 IN

## 2023-05-11 DIAGNOSIS — F32.A ANXIETY AND DEPRESSION: Primary | ICD-10-CM

## 2023-05-11 DIAGNOSIS — F41.9 ANXIETY AND DEPRESSION: Primary | ICD-10-CM

## 2023-05-11 RX ORDER — HYDROXYZINE HYDROCHLORIDE 10 MG/1
10 TABLET, FILM COATED ORAL 3 TIMES DAILY PRN
Qty: 90 TABLET | Refills: 3 | Status: SHIPPED | OUTPATIENT
Start: 2023-05-11

## 2023-05-11 RX ORDER — ESCITALOPRAM OXALATE 10 MG/1
10 TABLET ORAL DAILY
Qty: 30 TABLET | Refills: 1 | Status: SHIPPED | OUTPATIENT
Start: 2023-05-11

## 2023-05-11 NOTE — ASSESSMENT & PLAN NOTE
Patient's (Body mass index is 44.9 kg/m².) indicates that they are morbidly/severely obese (BMI > 40 or > 35 with obesity - related health condition) with health conditions that include none . Weight is unchanged. BMI  is above average; BMI management plan is completed. We discussed portion control and increasing exercise.

## 2023-05-11 NOTE — PROGRESS NOTES
"Subjective   Ashly Garay is a 38 y.o. female.   Chief Complaint   Patient presents with   • Anxiety   • Depression       History of Present Illness     Anxiety and depression:  -Last office visit 04/10/2023: started Lexapro 10 mg take 1/2 tablet for 7 days, then 1 tablet daily and hydroxyzine 25mg TID prn  - Patient stopped taking Lexapro last week due to nausea and headache.  The symptoms were tolerable on half tablet/5 mg Lexapro but became unbearable when she tried going up to the 10 mg tablet (had to miss work).  Symptoms resolved after stopping the medication for a few days  -States the 5 mg Lexapro did start making a difference in her mood (positively).  Stated that it \"knocked her out\" so she started taking it at bedtime  -Does not remember how hydroxyzine made her feel  - Today   PHQ-9: 10   MICHAEL-7: 6          The following portions of the patient's history were reviewed and updated as appropriate: allergies, current medications, past family history, past medical history, past social history, past surgical history and problem list.    Patient Active Problem List   Diagnosis   • Enlarged thyroid   • Genital herpes simplex   • Prediabetes   • Exposure to COVID-19 virus   • Overweight   • Hypothyroidism due to Hashimoto's thyroiditis   • Annual physical exam   • Class 3 severe obesity due to excess calories without serious comorbidity with body mass index (BMI) of 40.0 to 44.9 in adult   • Anxiety and depression       Current Outpatient Medications on File Prior to Visit   Medication Sig Dispense Refill   • levothyroxine (SYNTHROID, LEVOTHROID) 25 MCG tablet Take 1 tablet by mouth Daily. 90 tablet 1   • metFORMIN ER (GLUCOPHAGE-XR) 500 MG 24 hr tablet Take 1 tablet by mouth Daily With Breakfast. 30 tablet 3   • [DISCONTINUED] hydrOXYzine (ATARAX) 25 MG tablet Take 1 tablet by mouth 3 (Three) Times a Day As Needed for Itching. 90 tablet 0   • [DISCONTINUED] escitalopram (Lexapro) 10 MG tablet Take 1/2 tablet " "daily for the first 7 days, then increase up to full tablet daily (Patient not taking: Reported on 5/11/2023) 30 tablet 1     No current facility-administered medications on file prior to visit.     Current outpatient and discharge medications have been reconciled for the patient.  Reviewed by: Yissel Vann DO      No Known Allergies      Objective   Visit Vitals  /92 (BP Location: Right arm, Patient Position: Sitting, Cuff Size: Large Adult)   Pulse 77   Temp 96.9 °F (36.1 °C) (Temporal)   Resp 18   Ht 172.7 cm (67.99\")   Wt 134 kg (295 lb 3.2 oz)   SpO2 98%   BMI 44.90 kg/m²       Physical Exam  HENT:      Head: Normocephalic and atraumatic.   Eyes:      Conjunctiva/sclera: Conjunctivae normal.   Neurological:      General: No focal deficit present.      Mental Status: She is alert and oriented to person, place, and time.   Psychiatric:         Mood and Affect: Mood normal.         Behavior: Behavior normal.           Diagnoses and all orders for this visit:    1. Anxiety and depression (Primary)  -Discussed with patient that if she is getting benefit and the symptoms are tolerable on Lexapro 5 mg (half tablet 10 mg), then lets continue the half tablet/5 mg daily until her symptoms resolve/her body adjusts.  If she still feels that she needs to increase her dose after her body adjusts, we can go up to the full 10 mg tablet.  Patient agreeable to plan  -  escitalopram (Lexapro) 10 MG tablet; Take 1 tablet by mouth Daily. Take 1/2 tablet daily for the first 7 days, then increase up to full tablet daily  Dispense: 30 tablet; Refill: 1  -Also discussed with patient that some people get very drowsy on hydroxyzine due to it being an antihistamine.  Switched dose per tablet from 25 mg to 10 mg and told her she can take up to 5 (50 mg) 3 times daily as needed  -     hydrOXYzine (ATARAX) 10 MG tablet; Take 1 tablet by mouth 3 (Three) Times a Day As Needed for Anxiety.  Dispense: 90 tablet; Refill: 3     "         Follow Up  -1 month for anxiety and depression    Expected course, medications, and adverse effects discussed as appropriate.  Call or return if worsening or persistent symptoms.  I wore protective equipment throughout this patient encounter to include mask and eye protection. Hand hygiene was performed before donning protective equipment and after removal when leaving the room.    This document is intended for medical expert use only. Reading of this document by patients and/or patient's family without participating medical staff guidance may result in misinterpretation and unintended morbidity. Any interpretation of such data is the responsibility of the patient and/or family member responsible for the patient in concert with their primary or specialist providers, not to be left for sources of online searches such as [employer], [employer] or similar queries. Relying on these approaches to knowledge may result in misinterpretation, misguided goals of care and even death should patients or family members try recommendations outside of the realm of professional medical care.

## 2023-06-22 PROBLEM — Z00.00 ANNUAL PHYSICAL EXAM: Status: RESOLVED | Noted: 2023-04-13 | Resolved: 2023-06-22

## 2023-07-10 PROBLEM — Z20.822 EXPOSURE TO COVID-19 VIRUS: Status: RESOLVED | Noted: 2021-08-28 | Resolved: 2023-07-10

## 2024-01-17 ENCOUNTER — PATIENT ROUNDING (BHMG ONLY) (OUTPATIENT)
Dept: URGENT CARE | Facility: CLINIC | Age: 39
End: 2024-01-17
Payer: MEDICAID

## 2024-01-17 NOTE — ED NOTES
Thank you for letting us care for you in your recent visit to our urgent care center. We would love to hear about your experience with us. Was this the first time you have visited our location?    We’re always looking for ways to make our patients’ experiences even better. Do you have any recommendations on ways we may improve?     I appreciate you taking the time to respond. Please be on the lookout for a survey about your recent visit from [facility] via text or email. We would greatly appreciate if you could fill that out and turn it back in. We want your voice to be heard and we value your feedback.   Thank you for choosing [facility] for your healthcare needs.

## 2025-02-23 ENCOUNTER — HOSPITAL ENCOUNTER (EMERGENCY)
Facility: HOSPITAL | Age: 40
Discharge: HOME | End: 2025-02-24
Attending: EMERGENCY MEDICINE | Admitting: EMERGENCY MEDICINE
Payer: COMMERCIAL

## 2025-02-23 ENCOUNTER — APPOINTMENT (OUTPATIENT)
Dept: GENERAL RADIOLOGY | Facility: HOSPITAL | Age: 40
End: 2025-02-23
Payer: COMMERCIAL

## 2025-02-23 DIAGNOSIS — J10.1 INFLUENZA A: Primary | ICD-10-CM

## 2025-02-23 PROCEDURE — 93005 ELECTROCARDIOGRAM TRACING: CPT

## 2025-02-23 PROCEDURE — 99284 EMERGENCY DEPT VISIT MOD MDM: CPT

## 2025-02-23 PROCEDURE — 93005 ELECTROCARDIOGRAM TRACING: CPT | Performed by: EMERGENCY MEDICINE

## 2025-02-23 PROCEDURE — 87637 SARSCOV2&INF A&B&RSV AMP PRB: CPT | Performed by: EMERGENCY MEDICINE

## 2025-02-23 NOTE — Clinical Note
[facility] EMERGENCY DEPARTMENT  [address] [place] [address]  Phone: [phone]    Ashly Garay was seen and treated in our emergency department on 2/23/2025.  She may return to work on 02/27/2025.         Thank you for choosing [facility].    Jeremy Rosa MD

## 2025-02-24 ENCOUNTER — APPOINTMENT (OUTPATIENT)
Dept: GENERAL RADIOLOGY | Facility: HOSPITAL | Age: 40
End: 2025-02-24
Payer: COMMERCIAL

## 2025-02-24 VITALS
OXYGEN SATURATION: 98 % | WEIGHT: 293 LBS | BODY MASS INDEX: 44.41 KG/M2 | HEART RATE: 100 BPM | HEIGHT: 68 IN | SYSTOLIC BLOOD PRESSURE: 147 MMHG | DIASTOLIC BLOOD PRESSURE: 101 MMHG | TEMPERATURE: 98.5 F | RESPIRATION RATE: 20 BRPM

## 2025-02-24 LAB
FLUAV RNA RESP QL NAA+PROBE: DETECTED
FLUBV RNA RESP QL NAA+PROBE: NOT DETECTED
QT INTERVAL: 334 MS
QTC INTERVAL: 424 MS
RSV RNA RESP QL NAA+PROBE: NOT DETECTED
SARS-COV-2 RNA RESP QL NAA+PROBE: NOT DETECTED

## 2025-02-24 PROCEDURE — 71046 X-RAY EXAM CHEST 2 VIEWS: CPT

## 2025-02-24 NOTE — ED PROVIDER NOTES
Subjective   History of Present Illness  39-year-old female with cough, productive purulent sputum, some dyspnea and chest wall soreness with cough since Friday.  Patient's family member recently tested positive for flu A      Review of Systems   Constitutional:  Positive for fever.   Respiratory:  Positive for cough and shortness of breath.    Cardiovascular:  Positive for chest pain.       Past Medical History:   Diagnosis Date    Exposure to COVID-19 virus 2021       Allergies   Allergen Reactions    Promethazine Nausea And Vomiting       Past Surgical History:   Procedure Laterality Date    TUBAL ABDOMINAL LIGATION Bilateral 2009       Family History   Problem Relation Age of Onset    Hypertension Mother     Hypertension Father     Heart disease Father     Diabetes Brother     Heart disease Maternal Grandmother     Cancer Maternal Grandmother         skin    Diabetes Maternal Grandmother     Diabetes Maternal Grandfather     Hypertension Paternal Grandmother     Heart disease Paternal Grandmother     Cancer Paternal Grandfather         skin       Social History     Socioeconomic History    Marital status: Single   Tobacco Use    Smoking status: Former     Current packs/day: 0.00     Average packs/day: 0.5 packs/day for 5.2 years (2.6 ttl pk-yrs)     Types: Cigarettes     Start date: 2000     Quit date: 3/3/2005     Years since quittin.9     Passive exposure: Past    Smokeless tobacco: Never   Vaping Use    Vaping status: Never Used   Substance and Sexual Activity    Alcohol use: Yes     Comment: every 3 months/occasional, has 2 at one sitting    Drug use: Never    Sexual activity: Not Currently           Objective   Physical Exam  Constitutional:       General: She is not in acute distress.  HENT:      Head: Normocephalic and atraumatic.      Mouth/Throat:      Mouth: Mucous membranes are moist.      Pharynx: Oropharynx is clear.   Cardiovascular:      Rate and Rhythm: Normal rate and  regular rhythm.      Heart sounds: Normal heart sounds.   Pulmonary:      Effort: Pulmonary effort is normal.      Breath sounds: Normal breath sounds.   Skin:     General: Skin is warm and dry.   Neurological:      Mental Status: She is alert.   Psychiatric:         Mood and Affect: Mood normal.         Behavior: Behavior normal.         Procedures           ED Course                                                       Medical Decision Making  39-year-old female on her third day of influenza A, unremarkable exam and workup otherwise    Problems Addressed:  Influenza A: complicated acute illness or injury    Amount and/or Complexity of Data Reviewed  Labs: ordered.     Details: Influenza A positive  Radiology: ordered and independent interpretation performed.     Details: No acute disease  ECG/medicine tests: ordered and independent interpretation performed.     Details: EKG interpretation: Sinus rhythm, rate 97, no acute ST        Final diagnoses:   Influenza A       ED Disposition  ED Disposition       ED Disposition   Discharge    Condition   Stable    Comment   --               No follow-up provider specified.       Medication List      No changes were made to your prescriptions during this visit.            Jeremy Rosa MD  02/24/25 0053